# Patient Record
Sex: MALE | Race: WHITE | NOT HISPANIC OR LATINO | Employment: FULL TIME | URBAN - METROPOLITAN AREA
[De-identification: names, ages, dates, MRNs, and addresses within clinical notes are randomized per-mention and may not be internally consistent; named-entity substitution may affect disease eponyms.]

---

## 2025-03-28 ENCOUNTER — HOSPITAL ENCOUNTER (INPATIENT)
Facility: HOSPITAL | Age: 65
LOS: 1 days | Discharge: HOME | End: 2025-03-29
Attending: STUDENT IN AN ORGANIZED HEALTH CARE EDUCATION/TRAINING PROGRAM | Admitting: STUDENT IN AN ORGANIZED HEALTH CARE EDUCATION/TRAINING PROGRAM
Payer: MEDICAID

## 2025-03-28 ENCOUNTER — APPOINTMENT (OUTPATIENT)
Dept: RADIOLOGY | Facility: HOSPITAL | Age: 65
End: 2025-03-28
Payer: MEDICAID

## 2025-03-28 ENCOUNTER — APPOINTMENT (OUTPATIENT)
Dept: CARDIOLOGY | Facility: HOSPITAL | Age: 65
End: 2025-03-28
Payer: MEDICAID

## 2025-03-28 DIAGNOSIS — R41.82 ALTERED MENTAL STATUS, UNSPECIFIED ALTERED MENTAL STATUS TYPE: Primary | ICD-10-CM

## 2025-03-28 DIAGNOSIS — D50.9 IRON DEFICIENCY ANEMIA, UNSPECIFIED IRON DEFICIENCY ANEMIA TYPE: ICD-10-CM

## 2025-03-28 LAB
ALBUMIN SERPL BCP-MCNC: 4.7 G/DL (ref 3.4–5)
ALP SERPL-CCNC: 45 U/L (ref 33–136)
ALT SERPL W P-5'-P-CCNC: 14 U/L (ref 10–52)
ANION GAP SERPL CALC-SCNC: 13 MMOL/L (ref 10–20)
APPEARANCE UR: CLEAR
APTT PPP: 27 SECONDS (ref 26–36)
AST SERPL W P-5'-P-CCNC: 16 U/L (ref 9–39)
BASOPHILS # BLD MANUAL: 0.1 X10*3/UL (ref 0–0.1)
BASOPHILS NFR BLD MANUAL: 2 %
BILIRUB SERPL-MCNC: 0.4 MG/DL (ref 0–1.2)
BILIRUB UR STRIP.AUTO-MCNC: NEGATIVE MG/DL
BUN SERPL-MCNC: 24 MG/DL (ref 6–23)
CALCIUM SERPL-MCNC: 9.5 MG/DL (ref 8.6–10.3)
CARDIAC TROPONIN I PNL SERPL HS: <3 NG/L (ref 0–20)
CHLORIDE SERPL-SCNC: 101 MMOL/L (ref 98–107)
CHOLEST SERPL-MCNC: 159 MG/DL (ref 0–199)
CHOLESTEROL/HDL RATIO: 2.8
CO2 SERPL-SCNC: 24 MMOL/L (ref 21–32)
COLOR UR: COLORLESS
CREAT SERPL-MCNC: 1.15 MG/DL (ref 0.5–1.3)
CRP SERPL-MCNC: 0.26 MG/DL
DACRYOCYTES BLD QL SMEAR: NORMAL
EGFRCR SERPLBLD CKD-EPI 2021: 72 ML/MIN/1.73M*2
EOSINOPHIL # BLD MANUAL: 0.26 X10*3/UL (ref 0–0.7)
EOSINOPHIL NFR BLD MANUAL: 5 %
ERYTHROCYTE [DISTWIDTH] IN BLOOD BY AUTOMATED COUNT: 20.6 % (ref 11.5–14.5)
FLUAV RNA RESP QL NAA+PROBE: NOT DETECTED
FLUBV RNA RESP QL NAA+PROBE: NOT DETECTED
GLUCOSE BLD MANUAL STRIP-MCNC: 95 MG/DL (ref 74–99)
GLUCOSE SERPL-MCNC: 97 MG/DL (ref 74–99)
GLUCOSE UR STRIP.AUTO-MCNC: NORMAL MG/DL
HCT VFR BLD AUTO: 27.2 % (ref 41–52)
HDLC SERPL-MCNC: 55.9 MG/DL
HGB BLD-MCNC: 7.3 G/DL (ref 13.5–17.5)
HYPOCHROMIA BLD QL SMEAR: NORMAL
IMM GRANULOCYTES # BLD AUTO: 0.01 X10*3/UL (ref 0–0.7)
IMM GRANULOCYTES NFR BLD AUTO: 0.2 % (ref 0–0.9)
INR PPP: 1 (ref 0.9–1.1)
KETONES UR STRIP.AUTO-MCNC: NEGATIVE MG/DL
LDLC SERPL CALC-MCNC: 92 MG/DL
LEUKOCYTE ESTERASE UR QL STRIP.AUTO: NEGATIVE
LYMPHOCYTES # BLD MANUAL: 2.08 X10*3/UL (ref 1.2–4.8)
LYMPHOCYTES NFR BLD MANUAL: 40 %
MCH RBC QN AUTO: 16.7 PG (ref 26–34)
MCHC RBC AUTO-ENTMCNC: 26.8 G/DL (ref 32–36)
MCV RBC AUTO: 62 FL (ref 80–100)
MONOCYTES # BLD MANUAL: 0.42 X10*3/UL (ref 0.1–1)
MONOCYTES NFR BLD MANUAL: 8 %
NEUTS SEG # BLD MANUAL: 2.34 X10*3/UL (ref 1.2–7)
NEUTS SEG NFR BLD MANUAL: 45 %
NITRITE UR QL STRIP.AUTO: NEGATIVE
NON HDL CHOLESTEROL: 103 MG/DL (ref 0–149)
NRBC BLD-RTO: 0 /100 WBCS (ref 0–0)
OVALOCYTES BLD QL SMEAR: NORMAL
PH UR STRIP.AUTO: 5 [PH]
PLATELET # BLD AUTO: 297 X10*3/UL (ref 150–450)
POTASSIUM SERPL-SCNC: 4.3 MMOL/L (ref 3.5–5.3)
PROT SERPL-MCNC: 8.1 G/DL (ref 6.4–8.2)
PROT UR STRIP.AUTO-MCNC: NEGATIVE MG/DL
PROTHROMBIN TIME: 10.9 SECONDS (ref 9.8–12.4)
RBC # BLD AUTO: 4.37 X10*6/UL (ref 4.5–5.9)
RBC # UR STRIP.AUTO: NEGATIVE MG/DL
RBC MORPH BLD: NORMAL
SARS-COV-2 RNA RESP QL NAA+PROBE: NOT DETECTED
SCHISTOCYTES BLD QL SMEAR: NORMAL
SODIUM SERPL-SCNC: 134 MMOL/L (ref 136–145)
SP GR UR STRIP.AUTO: 1
STOMATOCYTES BLD QL SMEAR: NORMAL
TOTAL CELLS COUNTED BLD: 100
TRIGL SERPL-MCNC: 55 MG/DL (ref 0–149)
TSH SERPL-ACNC: 1.36 MIU/L (ref 0.44–3.98)
UROBILINOGEN UR STRIP.AUTO-MCNC: NORMAL MG/DL
VLDL: 11 MG/DL (ref 0–40)
WBC # BLD AUTO: 5.2 X10*3/UL (ref 4.4–11.3)

## 2025-03-28 PROCEDURE — 93005 ELECTROCARDIOGRAM TRACING: CPT

## 2025-03-28 PROCEDURE — 70551 MRI BRAIN STEM W/O DYE: CPT | Performed by: STUDENT IN AN ORGANIZED HEALTH CARE EDUCATION/TRAINING PROGRAM

## 2025-03-28 PROCEDURE — 99285 EMERGENCY DEPT VISIT HI MDM: CPT | Mod: 25 | Performed by: STUDENT IN AN ORGANIZED HEALTH CARE EDUCATION/TRAINING PROGRAM

## 2025-03-28 PROCEDURE — 81003 URINALYSIS AUTO W/O SCOPE: CPT

## 2025-03-28 PROCEDURE — 70498 CT ANGIOGRAPHY NECK: CPT

## 2025-03-28 PROCEDURE — G0426 INPT/ED TELECONSULT50: HCPCS | Performed by: STUDENT IN AN ORGANIZED HEALTH CARE EDUCATION/TRAINING PROGRAM

## 2025-03-28 PROCEDURE — 70551 MRI BRAIN STEM W/O DYE: CPT

## 2025-03-28 PROCEDURE — G0378 HOSPITAL OBSERVATION PER HR: HCPCS

## 2025-03-28 PROCEDURE — 80053 COMPREHEN METABOLIC PANEL: CPT

## 2025-03-28 PROCEDURE — 2550000001 HC RX 255 CONTRASTS: Performed by: STUDENT IN AN ORGANIZED HEALTH CARE EDUCATION/TRAINING PROGRAM

## 2025-03-28 PROCEDURE — 84484 ASSAY OF TROPONIN QUANT: CPT

## 2025-03-28 PROCEDURE — 80061 LIPID PANEL: CPT

## 2025-03-28 PROCEDURE — 87636 SARSCOV2 & INF A&B AMP PRB: CPT

## 2025-03-28 PROCEDURE — 86140 C-REACTIVE PROTEIN: CPT

## 2025-03-28 PROCEDURE — 2500000004 HC RX 250 GENERAL PHARMACY W/ HCPCS (ALT 636 FOR OP/ED)

## 2025-03-28 PROCEDURE — 99223 1ST HOSP IP/OBS HIGH 75: CPT

## 2025-03-28 PROCEDURE — 70496 CT ANGIOGRAPHY HEAD: CPT | Performed by: RADIOLOGY

## 2025-03-28 PROCEDURE — 70450 CT HEAD/BRAIN W/O DYE: CPT | Performed by: RADIOLOGY

## 2025-03-28 PROCEDURE — 85610 PROTHROMBIN TIME: CPT

## 2025-03-28 PROCEDURE — 85730 THROMBOPLASTIN TIME PARTIAL: CPT

## 2025-03-28 PROCEDURE — 36415 COLL VENOUS BLD VENIPUNCTURE: CPT

## 2025-03-28 PROCEDURE — 99285 EMERGENCY DEPT VISIT HI MDM: CPT | Performed by: STUDENT IN AN ORGANIZED HEALTH CARE EDUCATION/TRAINING PROGRAM

## 2025-03-28 PROCEDURE — 83036 HEMOGLOBIN GLYCOSYLATED A1C: CPT | Mod: STJLAB

## 2025-03-28 PROCEDURE — 85027 COMPLETE CBC AUTOMATED: CPT

## 2025-03-28 PROCEDURE — 70450 CT HEAD/BRAIN W/O DYE: CPT | Mod: 59

## 2025-03-28 PROCEDURE — 70498 CT ANGIOGRAPHY NECK: CPT | Performed by: RADIOLOGY

## 2025-03-28 PROCEDURE — 84443 ASSAY THYROID STIM HORMONE: CPT

## 2025-03-28 PROCEDURE — 71045 X-RAY EXAM CHEST 1 VIEW: CPT

## 2025-03-28 PROCEDURE — 1200000002 HC GENERAL ROOM WITH TELEMETRY DAILY

## 2025-03-28 PROCEDURE — 85007 BL SMEAR W/DIFF WBC COUNT: CPT

## 2025-03-28 PROCEDURE — 82947 ASSAY GLUCOSE BLOOD QUANT: CPT

## 2025-03-28 PROCEDURE — 2500000001 HC RX 250 WO HCPCS SELF ADMINISTERED DRUGS (ALT 637 FOR MEDICARE OP)

## 2025-03-28 PROCEDURE — 71045 X-RAY EXAM CHEST 1 VIEW: CPT | Mod: FOREIGN READ | Performed by: RADIOLOGY

## 2025-03-28 PROCEDURE — 82607 VITAMIN B-12: CPT | Mod: ELYLAB,STJLAB

## 2025-03-28 RX ORDER — ENOXAPARIN SODIUM 100 MG/ML
40 INJECTION SUBCUTANEOUS EVERY 24 HOURS
Status: DISCONTINUED | OUTPATIENT
Start: 2025-03-28 | End: 2025-03-29 | Stop reason: HOSPADM

## 2025-03-28 RX ORDER — TAMSULOSIN HYDROCHLORIDE 0.4 MG/1
0.4 CAPSULE ORAL DAILY
COMMUNITY

## 2025-03-28 RX ORDER — TAMSULOSIN HYDROCHLORIDE 0.4 MG/1
0.4 CAPSULE ORAL DAILY
Status: DISCONTINUED | OUTPATIENT
Start: 2025-03-29 | End: 2025-03-29 | Stop reason: HOSPADM

## 2025-03-28 RX ORDER — ATORVASTATIN CALCIUM 80 MG/1
80 TABLET, FILM COATED ORAL NIGHTLY
Status: DISCONTINUED | OUTPATIENT
Start: 2025-03-28 | End: 2025-03-29 | Stop reason: HOSPADM

## 2025-03-28 RX ORDER — HYDROXYZINE HYDROCHLORIDE 25 MG/1
25 TABLET, FILM COATED ORAL 3 TIMES DAILY PRN
COMMUNITY

## 2025-03-28 RX ORDER — ASPIRIN 81 MG/1
81 TABLET ORAL DAILY
Status: DISCONTINUED | OUTPATIENT
Start: 2025-03-28 | End: 2025-03-29 | Stop reason: HOSPADM

## 2025-03-28 RX ORDER — CLOPIDOGREL BISULFATE 300 MG/1
300 TABLET, FILM COATED ORAL ONCE
Status: CANCELLED | OUTPATIENT
Start: 2025-03-28

## 2025-03-28 RX ORDER — PANTOPRAZOLE SODIUM 40 MG/1
40 TABLET, DELAYED RELEASE ORAL
Status: DISCONTINUED | OUTPATIENT
Start: 2025-03-29 | End: 2025-03-29 | Stop reason: HOSPADM

## 2025-03-28 RX ORDER — OMEPRAZOLE 40 MG/1
40 CAPSULE, DELAYED RELEASE ORAL DAILY
COMMUNITY

## 2025-03-28 RX ORDER — CLOPIDOGREL BISULFATE 75 MG/1
75 TABLET ORAL DAILY
Status: CANCELLED | OUTPATIENT
Start: 2025-03-29

## 2025-03-28 RX ORDER — HYDROXYZINE HYDROCHLORIDE 25 MG/1
25 TABLET, FILM COATED ORAL 3 TIMES DAILY PRN
Status: DISCONTINUED | OUTPATIENT
Start: 2025-03-28 | End: 2025-03-29 | Stop reason: HOSPADM

## 2025-03-28 RX ADMIN — ASPIRIN 81 MG: 81 TABLET, COATED ORAL at 18:02

## 2025-03-28 RX ADMIN — IOHEXOL 70 ML: 350 INJECTION, SOLUTION INTRAVENOUS at 13:35

## 2025-03-28 RX ADMIN — ATORVASTATIN CALCIUM 80 MG: 80 TABLET, FILM COATED ORAL at 20:45

## 2025-03-28 RX ADMIN — HYDROXYZINE HYDROCHLORIDE 25 MG: 25 TABLET, FILM COATED ORAL at 18:02

## 2025-03-28 RX ADMIN — ENOXAPARIN SODIUM 40 MG: 40 INJECTION SUBCUTANEOUS at 20:45

## 2025-03-28 SDOH — ECONOMIC STABILITY: INCOME INSECURITY: IN THE PAST 12 MONTHS HAS THE ELECTRIC, GAS, OIL, OR WATER COMPANY THREATENED TO SHUT OFF SERVICES IN YOUR HOME?: NO

## 2025-03-28 SDOH — ECONOMIC STABILITY: FOOD INSECURITY: WITHIN THE PAST 12 MONTHS, THE FOOD YOU BOUGHT JUST DIDN'T LAST AND YOU DIDN'T HAVE MONEY TO GET MORE.: NEVER TRUE

## 2025-03-28 SDOH — SOCIAL STABILITY: SOCIAL INSECURITY: HAVE YOU HAD THOUGHTS OF HARMING ANYONE ELSE?: NO

## 2025-03-28 SDOH — SOCIAL STABILITY: SOCIAL INSECURITY: HAVE YOU HAD ANY THOUGHTS OF HARMING ANYONE ELSE?: NO

## 2025-03-28 SDOH — SOCIAL STABILITY: SOCIAL INSECURITY: ARE THERE ANY APPARENT SIGNS OF INJURIES/BEHAVIORS THAT COULD BE RELATED TO ABUSE/NEGLECT?: NO

## 2025-03-28 SDOH — SOCIAL STABILITY: SOCIAL INSECURITY: WITHIN THE LAST YEAR, HAVE YOU BEEN AFRAID OF YOUR PARTNER OR EX-PARTNER?: NO

## 2025-03-28 SDOH — ECONOMIC STABILITY: FOOD INSECURITY: WITHIN THE PAST 12 MONTHS, YOU WORRIED THAT YOUR FOOD WOULD RUN OUT BEFORE YOU GOT THE MONEY TO BUY MORE.: NEVER TRUE

## 2025-03-28 SDOH — SOCIAL STABILITY: SOCIAL INSECURITY: ARE YOU OR HAVE YOU BEEN THREATENED OR ABUSED PHYSICALLY, EMOTIONALLY, OR SEXUALLY BY ANYONE?: NO

## 2025-03-28 SDOH — SOCIAL STABILITY: SOCIAL INSECURITY: DO YOU FEEL ANYONE HAS EXPLOITED OR TAKEN ADVANTAGE OF YOU FINANCIALLY OR OF YOUR PERSONAL PROPERTY?: NO

## 2025-03-28 SDOH — SOCIAL STABILITY: SOCIAL INSECURITY: WITHIN THE LAST YEAR, HAVE YOU BEEN HUMILIATED OR EMOTIONALLY ABUSED IN OTHER WAYS BY YOUR PARTNER OR EX-PARTNER?: NO

## 2025-03-28 SDOH — SOCIAL STABILITY: SOCIAL INSECURITY
WITHIN THE LAST YEAR, HAVE YOU BEEN KICKED, HIT, SLAPPED, OR OTHERWISE PHYSICALLY HURT BY YOUR PARTNER OR EX-PARTNER?: NO

## 2025-03-28 SDOH — SOCIAL STABILITY: SOCIAL INSECURITY: DO YOU FEEL UNSAFE GOING BACK TO THE PLACE WHERE YOU ARE LIVING?: NO

## 2025-03-28 SDOH — SOCIAL STABILITY: SOCIAL INSECURITY: DOES ANYONE TRY TO KEEP YOU FROM HAVING/CONTACTING OTHER FRIENDS OR DOING THINGS OUTSIDE YOUR HOME?: NO

## 2025-03-28 SDOH — SOCIAL STABILITY: SOCIAL INSECURITY: ABUSE: ADULT

## 2025-03-28 SDOH — SOCIAL STABILITY: SOCIAL INSECURITY
WITHIN THE LAST YEAR, HAVE YOU BEEN RAPED OR FORCED TO HAVE ANY KIND OF SEXUAL ACTIVITY BY YOUR PARTNER OR EX-PARTNER?: NO

## 2025-03-28 SDOH — SOCIAL STABILITY: SOCIAL INSECURITY: HAS ANYONE EVER THREATENED TO HURT YOUR FAMILY OR YOUR PETS?: NO

## 2025-03-28 SDOH — SOCIAL STABILITY: SOCIAL INSECURITY: WERE YOU ABLE TO COMPLETE ALL THE BEHAVIORAL HEALTH SCREENINGS?: YES

## 2025-03-28 ASSESSMENT — COGNITIVE AND FUNCTIONAL STATUS - GENERAL
DAILY ACTIVITIY SCORE: 24
DAILY ACTIVITIY SCORE: 24
PATIENT BASELINE BEDBOUND: NO
MOBILITY SCORE: 24
MOBILITY SCORE: 24

## 2025-03-28 ASSESSMENT — LIFESTYLE VARIABLES
HAVE YOU EVER FELT YOU SHOULD CUT DOWN ON YOUR DRINKING: NO
AUDIT-C TOTAL SCORE: 0
AUDIT-C TOTAL SCORE: 0
SKIP TO QUESTIONS 9-10: 1
HOW MANY STANDARD DRINKS CONTAINING ALCOHOL DO YOU HAVE ON A TYPICAL DAY: PATIENT DOES NOT DRINK
TOTAL SCORE: 0
EVER HAD A DRINK FIRST THING IN THE MORNING TO STEADY YOUR NERVES TO GET RID OF A HANGOVER: NO
HOW OFTEN DO YOU HAVE 6 OR MORE DRINKS ON ONE OCCASION: NEVER
HOW OFTEN DO YOU HAVE A DRINK CONTAINING ALCOHOL: NEVER
HAVE PEOPLE ANNOYED YOU BY CRITICIZING YOUR DRINKING: NO
EVER FELT BAD OR GUILTY ABOUT YOUR DRINKING: NO

## 2025-03-28 ASSESSMENT — COLUMBIA-SUICIDE SEVERITY RATING SCALE - C-SSRS
2. HAVE YOU ACTUALLY HAD ANY THOUGHTS OF KILLING YOURSELF?: NO
1. IN THE PAST MONTH, HAVE YOU WISHED YOU WERE DEAD OR WISHED YOU COULD GO TO SLEEP AND NOT WAKE UP?: NO
6. HAVE YOU EVER DONE ANYTHING, STARTED TO DO ANYTHING, OR PREPARED TO DO ANYTHING TO END YOUR LIFE?: NO

## 2025-03-28 ASSESSMENT — ACTIVITIES OF DAILY LIVING (ADL)
HEARING - RIGHT EAR: FUNCTIONAL
FEEDING YOURSELF: INDEPENDENT
LACK_OF_TRANSPORTATION: NO
JUDGMENT_ADEQUATE_SAFELY_COMPLETE_DAILY_ACTIVITIES: YES
LACK_OF_TRANSPORTATION: NO
DRESSING YOURSELF: INDEPENDENT
PATIENT'S MEMORY ADEQUATE TO SAFELY COMPLETE DAILY ACTIVITIES?: YES
WALKS IN HOME: INDEPENDENT
ADEQUATE_TO_COMPLETE_ADL: YES
BATHING: INDEPENDENT
GROOMING: INDEPENDENT
TOILETING: INDEPENDENT
HEARING - LEFT EAR: FUNCTIONAL

## 2025-03-28 ASSESSMENT — PAIN DESCRIPTION - PAIN TYPE: TYPE: ACUTE PAIN

## 2025-03-28 ASSESSMENT — PAIN SCALES - GENERAL
PAINLEVEL_OUTOF10: 6
PAINLEVEL_OUTOF10: 0 - NO PAIN
PAINLEVEL_OUTOF10: 0 - NO PAIN

## 2025-03-28 ASSESSMENT — PATIENT HEALTH QUESTIONNAIRE - PHQ9
1. LITTLE INTEREST OR PLEASURE IN DOING THINGS: NOT AT ALL
2. FEELING DOWN, DEPRESSED OR HOPELESS: NOT AT ALL
SUM OF ALL RESPONSES TO PHQ9 QUESTIONS 1 & 2: 0

## 2025-03-28 ASSESSMENT — PAIN - FUNCTIONAL ASSESSMENT
PAIN_FUNCTIONAL_ASSESSMENT: 0-10

## 2025-03-28 ASSESSMENT — PAIN DESCRIPTION - LOCATION: LOCATION: ABDOMEN

## 2025-03-28 NOTE — ED PROVIDER NOTES
EMERGENCY DEPARTMENT ENCOUNTER      Pt Name: Donald Chowdary  MRN: [id number]  Birthdate 1960  Date of evaluation: 3/28/2025  Provider: South Rosenberg MD    CHIEF COMPLAINT       Chief Complaint   Patient presents with    Dizziness    Flu Symptoms    Headache     Started 4 days ago, endorse n/v weakness and sob      HISTORY OF PRESENT ILLNESS    HPI  64-year-old male presents emergency department chief complaint of altered mental status dizziness and flulike symptoms.  Patient claims that approximately 2 days ago he developed weakness and a headache he claims that he had a such severe headache he cannot report to work.  He claims that he rested at which point it went away today he developed difficulty walking dizziness and lightheadedness at approximately 10:30 in the afternoon.  He reported to work and into the person that he works for claims that he was acting very differently having shuffled gait and difficulty with speech.  Nursing Notes were reviewed.    PAST MEDICAL HISTORY     Past Medical History:   Diagnosis Date    Personal history of other diseases of the circulatory system 02/19/2021    History of peripheral vascular disease    Personal history of other venous thrombosis and embolism 02/19/2021    History of deep venous thrombosis    Unspecified injury of left eye and orbit, initial encounter 02/19/2021    Traumatic blindness of left eye         SURGICAL HISTORY       Past Surgical History:   Procedure Laterality Date    OTHER SURGICAL HISTORY  02/19/2021    Endovenous laser ablation         CURRENT MEDICATIONS       Previous Medications    No medications on file       ALLERGIES     Patient has no known allergies.    FAMILY HISTORY     No family history on file.       SOCIAL HISTORY       Social History     Socioeconomic History    Marital status:      Social Drivers of Health     Financial Resource Strain: Low Risk  (3/5/2025)    Received from [facility]    Overall Financial Resource  Strain (CARDIA)     Difficulty of Paying Living Expenses: Not hard at all   Food Insecurity: No Food Insecurity (3/5/2025)    Received from [facility]    Hunger Vital Sign     Worried About Running Out of Food in the Last Year: Never true     Ran Out of Food in the Last Year: Never true   Transportation Needs: No Transportation Needs (3/5/2025)    Received from [facility]    PRAPARE - Transportation     Lack of Transportation (Medical): No     Lack of Transportation (Non-Medical): No   Physical Activity: Insufficiently Active (3/5/2025)    Received from [facility]    Exercise Vital Sign     Days of Exercise per Week: 1 day     Minutes of Exercise per Session: 10 min   Stress: No Stress Concern Present (3/5/2025)    Received from [facility]    [employer] [place] [employer] - Occupational Stress Questionnaire     Feeling of Stress : Not at all   Social Connections: Moderately Isolated (3/5/2025)    Received from [facility]    Social Connection and Isolation Panel [NHANES]     Frequency of Communication with Friends and Family: More than three times a week     Frequency of Social Gatherings with Friends and Family: Twice a week     Attends Faith Services: More than 4 times per year     Active Member of Clubs or Organizations: No     Attends Club or Organization Meetings: Never     Marital Status:    Housing Stability: Unknown (3/5/2025)    Received from [facility]    Housing Stability Vital Sign     Unable to Pay for Housing in the Last Year: No       SCREENINGS                        PHYSICAL EXAM    (up to 7 for level 4, 8 or more for level 5)     ED Triage Vitals [03/28/25 1313]   Temperature Heart Rate Respirations BP   36.9 °C (98.4 °F) 76 18 114/73      Pulse Ox Temp Source Heart Rate Source Patient Position   98 % Temporal Monitor Sitting      BP Location FiO2 (%)     Right arm --       Physical Exam  HENT:      Head: Normocephalic and atraumatic.       Mouth/Throat:      Mouth: Mucous membranes are moist.      Pharynx: Oropharynx is clear.   Eyes:      Extraocular Movements: Extraocular movements intact.      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  The patient was endorsing blurry vision in his natural eye.   Cardiovascular:      Rate and Rhythm: Normal rate and regular rhythm.      Pulses: Normal pulses.      Heart sounds: Normal heart sounds.   Pulmonary:      Effort: Pulmonary effort is normal.   Abdominal:      General: Abdomen is flat.   Musculoskeletal:         General: Normal range of motion.   Skin:     General: Skin is warm.      Capillary Refill: Capillary refill takes less than 2 seconds.   Neurological:      Mental Status: He is alert and oriented to person, place, and time.      GCS: GCS eye subscore is 4. GCS verbal subscore is 5. GCS motor subscore is 6.      Sensory: Sensation is intact.      Motor: Weakness present.      Coordination: Heel to Shin Test normal.      Comments: Patient did have difficulty finger-nose on the right-hand side this could be due to the decreased Perception due to not having a left eye.          DIAGNOSTIC RESULTS     LABS:  Labs Reviewed   CBC WITH AUTO DIFFERENTIAL - Abnormal       Result Value    WBC 5.2      nRBC 0.0      RBC 4.37 (*)     Hemoglobin 7.3 (*)     Hematocrit 27.2 (*)     MCV 62 (*)     MCH 16.7 (*)     MCHC 26.8 (*)     RDW 20.6 (*)     Platelets 297      Immature Granulocytes %, Automated 0.2      Immature Granulocytes Absolute, Automated 0.01     COMPREHENSIVE METABOLIC PANEL - Abnormal    Glucose 97      Sodium 134 (*)     Potassium 4.3      Chloride 101      Bicarbonate 24      Anion Gap 13      Urea Nitrogen 24 (*)     Creatinine 1.15      eGFR 72      Calcium 9.5      Albumin 4.7      Alkaline Phosphatase 45      Total Protein 8.1      AST 16      Bilirubin, Total 0.4      ALT 14     TROPONIN I, HIGH SENSITIVITY - Normal     Troponin I, High Sensitivity <3      Narrative:     Less than 99th percentile of normal range cutoff-  Female and children under 18 years old <14 ng/L; Male <21 ng/L: Negative  Repeat testing should be performed if clinically indicated.     Female and children under 18 years old 14-50 ng/L; Male 21-50 ng/L:  Consistent with possible cardiac damage and possible increased clinical   risk. Serial measurements may help to assess extent of myocardial damage.     >50 ng/L: Consistent with cardiac damage, increased clinical risk and  myocardial infarction. Serial measurements may help assess extent of   myocardial damage.      NOTE: Children less than 1 year old may have higher baseline troponin   levels and results should be interpreted in conjunction with the overall   clinical context.     NOTE: Troponin I testing is performed using a different   testing methodology at [facility] than at other   [facility]. Direct result comparisons should only   be made within the same method.   PROTIME-INR - Normal    Protime 10.9      INR 1.0     APTT - Normal    aPTT 27      Narrative:     The APTT is no longer used for monitoring Unfractionated Heparin Therapy. For monitoring Heparin Therapy, use the Heparin Assay.   TSH WITH REFLEX TO FREE T4 IF ABNORMAL - Normal    Thyroid Stimulating Hormone 1.36      Narrative:     TSH testing is performed using different testing methodology at [facility] than at other [facility]. Direct result comparisons should only be made within the same method.     POCT GLUCOSE - Normal    POCT Glucose 95     INFLUENZA A AND B PCR   SARS-COV-2 PCR   URINALYSIS WITH REFLEX CULTURE AND MICROSCOPIC    Narrative:     The following orders were created for panel order Urinalysis with Reflex Culture and Microscopic.  Procedure                               Abnormality         Status                     ---------                               -----------         ------                      Urinalysis with Reflex C...[[id number]]                                                 Extra Urine Gray Tube[[id number]]                                                         Please view results for these tests on the individual orders.   URINALYSIS WITH REFLEX CULTURE AND MICROSCOPIC   EXTRA URINE GRAY TUBE   POCT GLUCOSE METER   MANUAL DIFFERENTIAL    Neutrophils %, Manual 45.0      Lymphocytes %, Manual 40.0      Monocytes %, Manual 8.0      Eosinophils %, Manual 5.0      Basophils %, Manual 2.0      Seg Neutrophils Absolute, Manual 2.34      Lymphocytes Absolute, Manual 2.08      Monocytes Absolute, Manual 0.42      Eosinophils Absolute, Manual 0.26      Basophils Absolute, Manual 0.10      Total Cells Counted 100      RBC Morphology See Below      Hypochromia Mild      RBC Fragments Few      Ovalocytes Few      Teardrop Cells Few      Stomatocytes Few         All other labs were within normal range or not returned as of this dictation.    Imaging  CT brain attack angio head and neck W and WO IV contrast   Final Result   1. No intracranial major vascular occlusion.   2. No flow-limiting stenosis or significant plaque irregularity in   the neck.        MACRO:   None        Signed by: Evgeny Urban 3/28/2025 2:17 PM   Dictation workstation:   [id number]      CT brain attack head wo IV contrast   Final Result   No acute intracranial pathology identified.             MACRO:   Kyree Lopez discussed the significance and urgency of this critical   finding by telephone with  HAMLET RUIZ on 3/28/2025 at 1:57 pm.   (**-RCF-**) Findings:  See findings.             Signed by: Kyree Lopez 3/28/2025 1:57 PM   Dictation workstation:   [id number]      XR chest 1 view    (Results Pending)        Procedures  Procedures     EMERGENCY DEPARTMENT COURSE/MDM:   Medical Decision Making  64-year-old male presents emergency department chief complaint of dizziness.  Medical management treatment emergency department  consist of brain attack protocol.  CT of the head is unremarkable CTA was also performed showed no acute concerning findings.  Labs are concerning for hemoglobin of 7.3.  However the patient is grossly chronically anemic.  Does have a past medical history of thalassemia.  Patient's troponins are negative. CT brain attack angio and CT head without contrast show-  IMPRESSION:  1. No intracranial major vascular occlusion.  2. No flow-limiting stenosis or significant plaque irregularity in  the neck.  Conversation was had with the patient regarding possible drug use he denies it.  Patient be admitted to the hospital service for continued observation and management.  Neurology did not have any appropriate recommendations.  Was static vitals in the patient were normal.  The patient is admitted to the hospital service.        ED Course as of 03/28/25 1631   Fri Mar 28, 2025   1414 HEMOGLOBIN(!): 7.3  [facility] blood work CBC  4/11/2024 hemoglobin 7.7  3/29/2024 hemoglobin 7.7  4/18/2023 hemoglobin 11 [FN]      ED Course User Index  [FN] Екатерина Brooks MD         Diagnoses as of 03/28/25 1631   Altered mental status, unspecified altered mental status type        Patient and or family in agreement and understanding of treatment plan.  All questions answered.      I reviewed the case with the attending ED physician. The attending ED physician agrees with the plan. Patient and/or patient´s representative was counseled regarding labs, imaging, likely diagnosis, and plan. All questions were answered.    ED Medications administered this visit:    Medications   iohexol (OMNIPaque) 350 mg iodine/mL solution 70 mL (70 mL intravenous Given 3/28/25 1335)       New Prescriptions from this visit:    New Prescriptions    No medications on file       Follow-up:  No follow-up provider specified.      Final Impression: No diagnosis found.      (Please note that portions of this note were completed with a voice recognition program.   Efforts were made to edit the dictations but occasionally words are mis-transcribed.)     South Rosenberg MD  Resident  03/28/25 0930

## 2025-03-28 NOTE — CONSULTS
"Inpatient consult to Neuro TeleStroke  Consult performed by: Marco Benton MD  Consult ordered by: Екатерина Brooks MD        Virtual Visit start time: 142 pm    History Of Present Illness:  Historian: Patient and ED Provider   Donald Chowdary is a 64 y.o. male presenting with 2 days of headache, disorientation, sleepiness, lightheadedness everytime he gets up, unsteady walking. He slept for most of the past couple days, felt rested and went to work today but symptoms came back at work. Does not have headache today but s feeling lightheaded everytime he gets up from sitting position as if he is going to collapse. Also feels confused. Denied any preceding infections, fever. No neck pain  Last known well: 2 days ago  Had stroke symptoms resolved at time of presentation: No   Current antiplatelet/anticoagulant use:  none    Prior Functional Status (Modified Lott Scale):  0 The patient has no residual symptoms.    Stroke Risk Factors:  none    Last Recorded Vitals:  Blood pressure 114/73, pulse 76, temperature 36.9 °C (98.4 °F), temperature source Temporal, resp. rate 18, height 1.803 m (5' 11\"), weight 90.1 kg (198 lb 10.2 oz), SpO2 98%.    NIHSS   1A. Level of Consciousness: 0  1B. Ask Month and Age: 0  1C. Blink Eyes & Squeeze Hands: 0  2. Best Gaze: 0  3. Visual: 0  4. Facial Palsy: 0  5A. Motor - Left Arm: 0  5B. Motor - Right Arm: 0  6A. Motor - Left Le  6B. Motor - Right Le  7. Limb Ataxia: 0  8. Sensory Loss: 0  9. Best Language: 0  10. Dysarthria: 0  11. Extinction and Inattention: 0  NIH Stroke Scale: 0     Able to walk with out assistance, normal gait.   Normal speech, language, intact comprehension to 3 step commands    Relevant Results:  LABS:  No results found for: \"GLUCOSE\", \"INR\"   Results for orders placed or performed during the hospital encounter of 25 (from the past 24 hours)   POCT GLUCOSE   Result Value Ref Range    POCT Glucose 95 74 - 99 mg/dL        CT Head Imaging:  [facility] imaging " personally reviewed, showed no acute ischemic / hemorrhagic changes     CTA Head and Neck Imaging:  CTA head and neck imaging personally reviewed, no large vessel occlusion or severe stenosis seen        Assessment:  Stroke not suspected, alternative etiology likely   Neurologic exam with no deficits concerning for acute ischemic stroke. Tiredness, headaches, positional lightheadedness, r/o infection/ metabolic encephalopathy.     IV Thrombolysis IV Thrombolysis Checklist        IV Thrombolysis Given: No; Thrombolysis contraindication reason: Working diagnosis is NOT a suspected ischemic stroke, Neurologic signs have spontaneously resolved , and Time from Last Known Well (or stroke onset) is >4.5 hours           Patient is a candidate for thrombectomy:  yes/no: No; contraindication reason: No evidence of proximal occlusion    Additional Recommendations:  Infectious, metabolic work up  Orthostatic vitals      Disposition:  Patient will remain at referring facility for further evaluation and management.    Virtual or Telephone Consent    An interactive audio and video telecommunication system which permits real time communications between the patient (at the originating site) and provider (at the distant site) was utilized to provide this telehealth service.   Verbal consent was requested and obtained from Donald Chowdary on this date, 03/28/25 for a telehealth visit.      Telestroke is covered in shift work. If there are further Neurological questions or concerns please contact your regional neurologist on call during daytime hours or contact the transfer center with an ADT20 order.    Marco Benton MD

## 2025-03-28 NOTE — DISCHARGE INSTRUCTIONS
You were admitted to the hospital with an transient episode of altered mental status, likely secondary to increased stress, anemia, and possible obstructive sleep apnea.     Please call and follow up with your primary care provider Dr. Hurt and schedule a follow up appointment in 2-3 days. Please discuss with your PCP regarding a sleep study evaluation.   Please establish care with hematologist (Dr. Ackerman) for your iron deficiency anemia and history of thalassemia. You've been provided with a referral to her office.       Please take all of your medications as directed.     New medications:    Ferrous sulfate 325mg tablet every other day      If you have any concerning symptoms, or worsening symptoms please call or return, such as chest pain, shortness of breath, new onset confusion, loss of sensation, or fever >103F.     Thank you for allowing us to participate in your health care.     -[facility] Inpatient Medicine Teaching Service

## 2025-03-28 NOTE — H&P
Internal Medicine    Admission H&P      Patient Donald Chowdary PCP Sendy Hurt MD    MRN [id number]  Admission Date 3/28/2025      Chief Complaint/Reason for Admission:  Donald is a 64 y.o. male who presented today with AMS    Subjective    Subjective   History of Presenting Illness  Mr. Donald Chowdary is a 64 y.o. 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 Patient seen and examined seen with his sons at bedside in the emergency department and is a good historian. Per patient and sons, he was noted by his client whom he is a  for to have trouble turning on the stove from 12: 30/1 PM. She was worried that patient was having a stroke so she drove him to the ED for evaluation. Patient is able to recall being driven to the hospital but cannot remember what happened at his clients house. He reports he's been having a headache associated with nausea and vomiting for the past two days.  Headache is better with being in a dark room and is exacerbated by wearing his prosthetic eye.  He has also been having dizziness with standing.  He had an episode of hematemesis 2 days ago when he noticed dark red blood in his vomitus.  In addition, he has been having dark diarrhea for the past few weeks although he has been on a beets diet.  He also reports some gum bleeding with toothbrushing.  He denies any recent NSAID use.  He has had his prosthetic left eye since 6 and half years old after a traumatic incident.  Patient is currently feeling well.  He denies any chest pain, shortness of breath at rest, palpitations, abdominal pain, nausea, dysuria, or diarrhea.    ED course:  VS: /73  Pulse 76  Temp 369 °C  SpO2 98% on RA  RR 18  Labs: CBC notable for hemoglobin of 7.3 (Hgb in 2024 was 7.7), CMP remarkable for hyponatremia 134,  normal TSH and glucose, negative flu and COVID,  EKG: NSR with HR of 73 bpm, prolonged QTc of 513  Imaging: CXR shows mild central pulmonary venous congestion. Mild right basilar linear airspace disease suggestive for atelectasis. Moderate to large residual hiatal hernia.  CT head without contrast negative for any acute intracranial abnormalities  CTA head and neck showed no intracranial major vascular occlusion. No flow-limiting stenosis or significant plaque irregularity in the neck.  Intervention: Patient was evaluated by telestroke neurologist who had low suspicion for stroke, negative orthostats    Patient was admitted to [facility] with telemetry under teaching for further management of TIA.    Past Medical History  Active Ambulatory Problems     Diagnosis Date Noted    No Active Ambulatory Problems     Resolved Ambulatory Problems     Diagnosis Date Noted    No Resolved Ambulatory Problems     Past Medical History:   Diagnosis Date    Personal history of other diseases of the circulatory system 02/19/2021    Personal history of other venous thrombosis and embolism 02/19/2021    Unspecified injury of left eye and orbit, initial encounter 02/19/2021       Past Surgical History   Past Surgical History:   Procedure Laterality Date    OTHER SURGICAL HISTORY  02/19/2021    Endovenous laser ablation       Social History  Never a smoker. No alcohol or recreational drug use.    Home Medication:  Prior to Admission medications    Medication Sig Start Date End Date Taking? Authorizing Provider   hydrOXYzine HCL (Atarax) 25 mg tablet Take 1 tablet (25 mg) by mouth 3 times a day as needed for anxiety.  Patient taking differently: Take 1 tablet (25 mg) by mouth 3 times a day.   Yes Historical Provider, MD   omeprazole (PriLOSEC) 40 mg DR capsule Take 1 capsule (40 mg) by mouth once daily. Do not crush or chew.   Yes Historical Provider, MD   tamsulosin (Flomax) 0.4 mg 24 hr capsule Take 1 capsule (0.4 mg) by mouth once daily.    "Yes Historical Provider, MD        Family History  Family history of thalassemia minor.    Allergies:  No Known Allergies     Review of System:  12-system ROS negative except as documented in HPI    Objective    Objective   Vital Signs:  Visit Vitals  /67 Comment: standing   Pulse 84   Temp 36.9 °C (98.4 °F) (Temporal)   Resp 18   Ht 1.803 m (5' 11\")   Wt 90.1 kg (198 lb 10.2 oz)   SpO2 100%   BMI 27.70 kg/m²   BSA 2.12 m²        Physical Examination:  General: A&Ox4, alert, coopertive, not in acute distress, resting comfortably in bed upon examination   HEENT: Normocephalic, atraumatic, EOMI, moist mucous membranes  Neck: Neck supple, trachea midline, no evidence of trauma  Cardiovascular: RRR, S1 & S2 appreciated, no murmurs, rubs, or gallops appreciated, distal pulses 2+ bilaterally (radial and dorsalis pedis)  Respiratory: CTAB, no respiratory distress, no wheezing, rales or rhonchi, on RA  Abdomen: Soft, nondistended, nontender to palpation, +bowel sounds, no guarding rigidity or rebound tenderness  MSK: No joint swelling, normal movements of all extremities.   Neuro: No focal deficits, normal motor function, normal sensation, follows all commands. No asterixis noted, NIH of 0  Skin: Warm and dry, no lesions, contusions, or erythema.  Psychiatric: Judgment intact.  Appropriate mood, affect and behavior.    Laboratory Data:  Results for orders placed or performed during the hospital encounter of 03/28/25 (from the past 24 hours)   POCT GLUCOSE   Result Value Ref Range    POCT Glucose 95 74 - 99 mg/dL   CBC and Auto Differential   Result Value Ref Range    WBC 5.2 4.4 - 11.3 x10*3/uL    nRBC 0.0 0.0 - 0.0 /100 WBCs    RBC 4.37 (L) 4.50 - 5.90 x10*6/uL    Hemoglobin 7.3 (L) 13.5 - 17.5 g/dL    Hematocrit 27.2 (L) 41.0 - 52.0 %    MCV 62 (L) 80 - 100 fL    MCH 16.7 (L) 26.0 - 34.0 pg    MCHC 26.8 (L) 32.0 - 36.0 g/dL    RDW 20.6 (H) 11.5 - 14.5 %    Platelets 297 150 - 450 x10*3/uL    Immature Granulocytes %, " Automated 0.2 0.0 - 0.9 %    Immature Granulocytes Absolute, Automated 0.01 0.00 - 0.70 x10*3/uL   Comprehensive metabolic panel   Result Value Ref Range    Glucose 97 74 - 99 mg/dL    Sodium 134 (L) 136 - 145 mmol/L    Potassium 4.3 3.5 - 5.3 mmol/L    Chloride 101 98 - 107 mmol/L    Bicarbonate 24 21 - 32 mmol/L    Anion Gap 13 10 - 20 mmol/L    Urea Nitrogen 24 (H) 6 - 23 mg/dL    Creatinine 1.15 0.50 - 1.30 mg/dL    eGFR 72 >60 mL/min/1.73m*2    Calcium 9.5 8.6 - 10.3 mg/dL    Albumin 4.7 3.4 - 5.0 g/dL    Alkaline Phosphatase 45 33 - 136 U/L    Total Protein 8.1 6.4 - 8.2 g/dL    AST 16 9 - 39 U/L    Bilirubin, Total 0.4 0.0 - 1.2 mg/dL    ALT 14 10 - 52 U/L   Troponin I, High Sensitivity   Result Value Ref Range    Troponin I, High Sensitivity <3 0 - 20 ng/L   Protime-INR   Result Value Ref Range    Protime 10.9 9.8 - 12.4 seconds    INR 1.0 0.9 - 1.1   APTT   Result Value Ref Range    aPTT 27 26 - 36 seconds   TSH with reflex to Free T4 if abnormal   Result Value Ref Range    Thyroid Stimulating Hormone 1.36 0.44 - 3.98 mIU/L   Manual Differential   Result Value Ref Range    Neutrophils %, Manual 45.0 40.0 - 80.0 %    Lymphocytes %, Manual 40.0 13.0 - 44.0 %    Monocytes %, Manual 8.0 2.0 - 10.0 %    Eosinophils %, Manual 5.0 0.0 - 6.0 %    Basophils %, Manual 2.0 0.0 - 2.0 %    Seg Neutrophils Absolute, Manual 2.34 1.20 - 7.00 x10*3/uL    Lymphocytes Absolute, Manual 2.08 1.20 - 4.80 x10*3/uL    Monocytes Absolute, Manual 0.42 0.10 - 1.00 x10*3/uL    Eosinophils Absolute, Manual 0.26 0.00 - 0.70 x10*3/uL    Basophils Absolute, Manual 0.10 0.00 - 0.10 x10*3/uL    Total Cells Counted 100     RBC Morphology See Below     Hypochromia Mild     RBC Fragments Few     Ovalocytes Few     Teardrop Cells Few     Stomatocytes Few    Influenza A, and B PCR   Result Value Ref Range    Flu A Result Not Detected Not Detected    Flu B Result Not Detected Not Detected   Sars-CoV-2 PCR   Result Value Ref Range    Coronavirus  2019, PCR Not Detected Not Detected   Urinalysis with Reflex Culture and Microscopic   Result Value Ref Range    Color, Urine Colorless (N) Light-Yellow, Yellow, Dark-Yellow    Appearance, Urine Clear Clear    Specific Gravity, Urine 1.003 (N) 1.005 - 1.035    pH, Urine 5.0 5.0, 5.5, 6.0, 6.5, 7.0, 7.5, 8.0    Protein, Urine NEGATIVE NEGATIVE, 10 (TRACE), 20 (TRACE) mg/dL    Glucose, Urine Normal Normal mg/dL    Blood, Urine NEGATIVE NEGATIVE mg/dL    Ketones, Urine NEGATIVE NEGATIVE mg/dL    Bilirubin, Urine NEGATIVE NEGATIVE mg/dL    Urobilinogen, Urine Normal Normal mg/dL    Nitrite, Urine NEGATIVE NEGATIVE    Leukocyte Esterase, Urine NEGATIVE NEGATIVE       Imaging:  XR chest 1 view    Result Date: 3/28/2025  STUDY: Chest Radiograph;  03/28/2025 at 2:40 PM INDICATION: Dizziness. COMPARISON: CTA chest 12/28/20. ACCESSION NUMBER(S): [id number] ORDERING CLINICIAN: HAMLET RUIZ TECHNIQUE:  Frontal chest was obtained at 1440 hours. FINDINGS: CARDIOMEDIASTINAL SILHOUETTE: Cardiomediastinal silhouette is normal in size.  There is mild prominence of the central pulmonary vasculature.  Increased focal radiolucency overlying the left central and inferior mediastinum likely reflects a large hiatal hernia. LUNGS: Shallow inspiration with mild bilateral basilar atelectasis.  ABDOMEN: No remarkable upper abdominal findings.  BONES: No acute osseous changes.    Suggestion of mild central pulmonary venous congestion.  Mild right basilar linear airspace disease suggestive for atelectasis.  Moderate to large residual hiatal hernia. Signed by Tay David MD    CT brain attack angio head and neck W and WO IV contrast    Result Date: 3/28/2025  Interpreted By:  Evgeny Urban, STUDY: CT BRAIN ATTACK ANGIO HEAD AND NECK W AND WO IV CONTRAST;  3/28/2025 1:42 pm   INDICATION: Signs/Symptoms:Blurry vision and remaining eye right side.     COMPARISON: CT head today.   ACCESSION NUMBER(S): [id number]   ORDERING CLINICIAN:  HAMLET RUIZ   TECHNIQUE: 70 ML of Omnipaque 350 was administered intravenously and axial images of the head and neck were acquired.  Coronal, sagittal, and 3-D reconstructions were provided for review.   FINDINGS:   CTA COW: No major vascular occlusion.    No aneurysms.  No vascular malformations. Patent dural venous sinuses and intracranial deep venous structures.   CTA CAROTID: No aortic aneurysm or dissection. No significant stenoses at the origins of the great vessels from the aortic arch. No significant stenoses of the brachycephalic artery or bilateral subclavian arteries.   No significant stenoses bilateral carotid arterial systems. No significant stenoses bilateral vertebral arterial systems.   NONVASCULAR NECK: No soft tissue mass. No adenopathy. Salivary glands are unremarkable. Thyroid gland unremarkable. Bones intact.   The esophagus is somewhat distended with a fluid gas level therein. This was also seen on the chest CT from 2020. Correlate clinically for dysmotility.       1. No intracranial major vascular occlusion. 2. No flow-limiting stenosis or significant plaque irregularity in the neck.   MACRO: None   Signed by: Evgeny Urban 3/28/2025 2:17 PM Dictation workstation:   [id number]    CT brain attack head wo IV contrast    Result Date: 3/28/2025  Interpreted By:  Kyree Lopez, STUDY: CT BRAIN ATTACK HEAD WO IV CONTRAST  3/28/2025 1:32 pm   INDICATION: Signs/Symptoms:Stroke Evaluation   COMPARISON: None.   ACCESSION NUMBER(S): [id number]   ORDERING CLINICIAN: HAMLET RUIZ   TECHNIQUE: Contiguous axial CT images of the brain were obtained without IV contrast.   FINDINGS: There is streak artifact from a left ocular prosthesis which could obscure findings. The ventricles, cisterns and sulci are prominent, consistent with  mild diffuse volume loss.   Gray-white differentiation is preserved. No acute intracranial hemorrhage or mass effect. No midline shift. Patent basal cisterns. No extraaxial  fluid collections.   The calvaria is intact. The visualized paranasal sinuses and mastoid air cells are clear.       No acute intracranial pathology identified.     MACRO: Kyree Lopez discussed the significance and urgency of this critical finding by telephone with  HAMLET RUIZ on 3/28/2025 at 1:57 pm. (**-RCF-**) Findings:  See findings.     Signed by: Kyree Lopez 3/28/2025 1:57 PM Dictation workstation:   [id number]      Medications:  Scheduled medications    Continuous medications    PRN medications    Assessment    Assessment & Plan   Mr. Donald Chowdary is a 64 y.o. male with PMH significant for YAMILE not on iron supplements, thalassemia minor, symptomatic paraesoph hernia, GERD, lumbar stenosis, hx of DVT and PVD, traumatic blindness with L prosthetic eye who presented to [facility] ED on 3/28/25 with AMS.  Vitally stable on presentation.  CT head and CTA head and neck were negative for any acute intracranial abnormalities.  Patient was admitted to [facility] with telemetry for further stroke workup.    Assessment & Plan  Altered mental status, unspecified altered mental status type    #Transient AMS, c/f for TIA  - CT head showed no acute intracranial pathology.  - CTA head/neck showed showed no intracranial major vascular occlusion. No flow-limiting stenosis or significant plaque irregularity in the neck.  - Lipid panel and TSH wnl   Plan:  - Started on ASA 81 mg, atorvastatin 80  - MRI brain wo contrast ordered  - Hemoglobin A1c ordered  - Q4h Neurocheck   - Limited TTE with bubble study ordered to assess for PFO/atrial septal aneurysm, thrombus or vegetation  - Permissive hypertension less than 220/120  - Neurology consulted, appreciate recs  - PT/OT consult    #One episode of hematemesis  #History of YAMILE not on iron supplements  #Hx of thalassemia minor  -Hemoglobin of 7.3 on admission  -On chart review, patient's hemoglobin was 7.7 in 2024 and 11 in 2023, iron studies indicative of YAMILE  -EGD 3/7/2023 showed normal  duodenum, erosive gastropathy with no bleeding and no stigmata of recent bleeding,10 cm hiatal hernia, LA Grade B reflux esophagitis with no bleeding.   -Monitor hemoglobin on morning labs and for any overt signs of bleeding  -Transfuse if hemoglobin less than 7    CHRONIC PROBLEMS:  #Symptomatic paraesoph hernia  #GERD  #Lumbar stenosis  #Hx of DVT and PVD  #Traumatic blindness with L prosthetic eye  - Continue home medications as appropriate    Global Plan of Care  IVF: as needed  Electrolytes: Replete with goal K>4 and Mg>2  Diet: Cardiac  DVT prophylaxis: Subcutaneous Lovenox  Bowel regime: None in the setting of diarrhea  Consults: Neurology, PT/OT  Code Status: FULL    Dispo: Anticipate 1-2 midnight stays pending further neuro workup and evaluation    Patient seen and discussed with senior resident. To be staffed with attending.    Darcy Redmond, DO  Internal Medicine, PGY 1  March 28, 2025      I saw this patient with the above intern and performed my own History and Physical Examination.   My Subjective and Objective findings are reflected in the above documentation.  The Assessment and Plan reflect my input. My Edits are included in the above documentation.    Gina Sethi, DO  Internal Medicine PGY3

## 2025-03-28 NOTE — HOSPITAL COURSE
Mr. Donald Chowdary is a 64 y.o. male with PMH significant for YAMILE not on iron supplements, thalassemia minor, symptomatic paraesoph hernia, GERD, lumbar stenosis, hx of DVT and PVD, traumatic blindness with L prosthetic eye who presented to [facility] ED on 3/28/25 with an episode of transient AMS. Vitally stable on presentation. CMP was grossly unremarkable and CBC was remarkable for hemoglobin of 7.3 (Hgb in April 2024 was 7.7 on chart review). CT head and CTA head and neck were negative for any acute intracranial abnormalities. Patient was evaluated by telestroke neurologist in the emergency who had low suspicion for stroke. No focal neuro deficits on exam and patient had NIHSS of 0. Patient was admitted to [facility] with telemetry for further TIA workup.     Patient remained hemodynamically stable overnight. He received MRI brain which was negative. His morning CBC was significant for microcytic anemia with hemoglobin of 7.3. Iron workup was repeated here in the hospital, showing ferritin of 8, iron of 10, TIBC 430, percent saturation of 2. Patient received one Venofer infusion and was provided with a referral to hematology for his YAMILE and Thalassemia as he does not follow with a hematologist outpatient. Transient AMS episode likely secondary to anemia, increased stress, and insomnia. He was evaluated by neurologist who agreed and cleared patient for discharge with recommendation of outpatient sleep study. Patient will be discharged home with prescription for ferrous sulfate to be taken every other day and instructions to follow-up with his primary care provider within 1 week as well as to establish care with hematology. Current discharge plan was discussed with patient. Patient is agreeable and verbalized understanding. All questions were answered and patient is medically stable for discharge.

## 2025-03-29 VITALS
SYSTOLIC BLOOD PRESSURE: 121 MMHG | WEIGHT: 198.63 LBS | HEART RATE: 77 BPM | OXYGEN SATURATION: 95 % | HEIGHT: 71 IN | RESPIRATION RATE: 17 BRPM | TEMPERATURE: 98.2 F | DIASTOLIC BLOOD PRESSURE: 80 MMHG | BODY MASS INDEX: 27.81 KG/M2

## 2025-03-29 LAB
ALBUMIN SERPL BCP-MCNC: 4.1 G/DL (ref 3.4–5)
ANION GAP SERPL CALC-SCNC: 10 MMOL/L (ref 10–20)
ATRIAL RATE: 76 BPM
BUN SERPL-MCNC: 25 MG/DL (ref 6–23)
CALCIUM SERPL-MCNC: 9.1 MG/DL (ref 8.6–10.3)
CHLORIDE SERPL-SCNC: 106 MMOL/L (ref 98–107)
CO2 SERPL-SCNC: 26 MMOL/L (ref 21–32)
CREAT SERPL-MCNC: 1.05 MG/DL (ref 0.5–1.3)
EGFRCR SERPLBLD CKD-EPI 2021: 80 ML/MIN/1.73M*2
ERYTHROCYTE [DISTWIDTH] IN BLOOD BY AUTOMATED COUNT: 20.5 % (ref 11.5–14.5)
EST. AVERAGE GLUCOSE BLD GHB EST-MCNC: 114 MG/DL
FERRITIN SERPL-MCNC: 8 NG/ML (ref 20–300)
GLUCOSE SERPL-MCNC: 95 MG/DL (ref 74–99)
HBA1C MFR BLD: 5.6 %
HCT VFR BLD AUTO: 25.5 % (ref 41–52)
HGB BLD-MCNC: 7 G/DL (ref 13.5–17.5)
HOLD SPECIMEN: NORMAL
IRON SATN MFR SERPL: 2 % (ref 25–45)
IRON SERPL-MCNC: 10 UG/DL (ref 35–150)
MAGNESIUM SERPL-MCNC: 2.15 MG/DL (ref 1.6–2.4)
MCH RBC QN AUTO: 17 PG (ref 26–34)
MCHC RBC AUTO-ENTMCNC: 27.5 G/DL (ref 32–36)
MCV RBC AUTO: 62 FL (ref 80–100)
NRBC BLD-RTO: 0 /100 WBCS (ref 0–0)
P AXIS: 38 DEGREES
P OFFSET: 195 MS
P ONSET: 135 MS
PHOSPHATE SERPL-MCNC: 4.5 MG/DL (ref 2.5–4.9)
PLATELET # BLD AUTO: 313 X10*3/UL (ref 150–450)
POTASSIUM SERPL-SCNC: 4.4 MMOL/L (ref 3.5–5.3)
PR INTERVAL: 168 MS
Q ONSET: 219 MS
QRS COUNT: 13 BEATS
QRS DURATION: 100 MS
QT INTERVAL: 456 MS
QTC CALCULATION(BAZETT): 513 MS
QTC FREDERICIA: 493 MS
R AXIS: 38 DEGREES
RBC # BLD AUTO: 4.12 X10*6/UL (ref 4.5–5.9)
SODIUM SERPL-SCNC: 138 MMOL/L (ref 136–145)
T AXIS: 28 DEGREES
T OFFSET: 447 MS
TIBC SERPL-MCNC: 430 UG/DL (ref 240–445)
UIBC SERPL-MCNC: 420 UG/DL (ref 110–370)
VENTRICULAR RATE: 76 BPM
VIT B12 SERPL-MCNC: 671 PG/ML (ref 211–911)
WBC # BLD AUTO: 4.5 X10*3/UL (ref 4.4–11.3)

## 2025-03-29 PROCEDURE — 2500000001 HC RX 250 WO HCPCS SELF ADMINISTERED DRUGS (ALT 637 FOR MEDICARE OP)

## 2025-03-29 PROCEDURE — 99233 SBSQ HOSP IP/OBS HIGH 50: CPT | Performed by: SPECIALIST

## 2025-03-29 PROCEDURE — 80069 RENAL FUNCTION PANEL: CPT

## 2025-03-29 PROCEDURE — 2500000002 HC RX 250 W HCPCS SELF ADMINISTERED DRUGS (ALT 637 FOR MEDICARE OP, ALT 636 FOR OP/ED)

## 2025-03-29 PROCEDURE — 2500000004 HC RX 250 GENERAL PHARMACY W/ HCPCS (ALT 636 FOR OP/ED): Performed by: STUDENT IN AN ORGANIZED HEALTH CARE EDUCATION/TRAINING PROGRAM

## 2025-03-29 PROCEDURE — 99239 HOSP IP/OBS DSCHRG MGMT >30: CPT

## 2025-03-29 PROCEDURE — 82728 ASSAY OF FERRITIN: CPT | Performed by: STUDENT IN AN ORGANIZED HEALTH CARE EDUCATION/TRAINING PROGRAM

## 2025-03-29 PROCEDURE — 83540 ASSAY OF IRON: CPT | Performed by: STUDENT IN AN ORGANIZED HEALTH CARE EDUCATION/TRAINING PROGRAM

## 2025-03-29 PROCEDURE — G0378 HOSPITAL OBSERVATION PER HR: HCPCS

## 2025-03-29 PROCEDURE — 36415 COLL VENOUS BLD VENIPUNCTURE: CPT

## 2025-03-29 PROCEDURE — 83735 ASSAY OF MAGNESIUM: CPT

## 2025-03-29 PROCEDURE — 85027 COMPLETE CBC AUTOMATED: CPT

## 2025-03-29 RX ORDER — FERROUS SULFATE 325(65) MG
325 TABLET, DELAYED RELEASE (ENTERIC COATED) ORAL EVERY OTHER DAY
Qty: 15 TABLET | Refills: 0 | Status: SHIPPED | OUTPATIENT
Start: 2025-03-29 | End: 2025-04-28

## 2025-03-29 RX ADMIN — ASPIRIN 81 MG: 81 TABLET, COATED ORAL at 09:07

## 2025-03-29 RX ADMIN — PANTOPRAZOLE SODIUM 40 MG: 40 TABLET, DELAYED RELEASE ORAL at 06:00

## 2025-03-29 RX ADMIN — TAMSULOSIN HYDROCHLORIDE 0.4 MG: 0.4 CAPSULE ORAL at 09:07

## 2025-03-29 RX ADMIN — SODIUM CHLORIDE 300 MG: 9 INJECTION, SOLUTION INTRAVENOUS at 10:24

## 2025-03-29 SDOH — ECONOMIC STABILITY: FOOD INSECURITY: WITHIN THE PAST 12 MONTHS, YOU WORRIED THAT YOUR FOOD WOULD RUN OUT BEFORE YOU GOT THE MONEY TO BUY MORE.: NEVER TRUE

## 2025-03-29 SDOH — ECONOMIC STABILITY: INCOME INSECURITY: IN THE PAST 12 MONTHS HAS THE ELECTRIC, GAS, OIL, OR WATER COMPANY THREATENED TO SHUT OFF SERVICES IN YOUR HOME?: NO

## 2025-03-29 SDOH — ECONOMIC STABILITY: FOOD INSECURITY: WITHIN THE PAST 12 MONTHS, THE FOOD YOU BOUGHT JUST DIDN'T LAST AND YOU DIDN'T HAVE MONEY TO GET MORE.: NEVER TRUE

## 2025-03-29 SDOH — ECONOMIC STABILITY: HOUSING INSECURITY: AT ANY TIME IN THE PAST 12 MONTHS, WERE YOU HOMELESS OR LIVING IN A SHELTER (INCLUDING NOW)?: NO

## 2025-03-29 SDOH — ECONOMIC STABILITY: HOUSING INSECURITY: IN THE PAST 12 MONTHS, HOW MANY TIMES HAVE YOU MOVED WHERE YOU WERE LIVING?: 0

## 2025-03-29 SDOH — ECONOMIC STABILITY: HOUSING INSECURITY: IN THE LAST 12 MONTHS, WAS THERE A TIME WHEN YOU WERE NOT ABLE TO PAY THE MORTGAGE OR RENT ON TIME?: NO

## 2025-03-29 SDOH — ECONOMIC STABILITY: TRANSPORTATION INSECURITY: IN THE PAST 12 MONTHS, HAS LACK OF TRANSPORTATION KEPT YOU FROM MEDICAL APPOINTMENTS OR FROM GETTING MEDICATIONS?: NO

## 2025-03-29 SDOH — ECONOMIC STABILITY: FOOD INSECURITY: HOW HARD IS IT FOR YOU TO PAY FOR THE VERY BASICS LIKE FOOD, HOUSING, MEDICAL CARE, AND HEATING?: NOT HARD AT ALL

## 2025-03-29 ASSESSMENT — PAIN SCALES - GENERAL
PAINLEVEL_OUTOF10: 0 - NO PAIN

## 2025-03-29 ASSESSMENT — ACTIVITIES OF DAILY LIVING (ADL): LACK_OF_TRANSPORTATION: NO

## 2025-03-29 ASSESSMENT — PAIN - FUNCTIONAL ASSESSMENT
PAIN_FUNCTIONAL_ASSESSMENT: 0-10
PAIN_FUNCTIONAL_ASSESSMENT: 0-10

## 2025-03-29 NOTE — PROGRESS NOTES
Physical Therapy                 Therapy Communication Note    Patient Name: Donald Chowdary  MRN: [id number]  Department: [facility] [address]  Room: [facility]  Today's Date: 3/29/2025     Discipline: Physical Therapy      Missed Time: Attempt    Comment: Functional Screen completed. Pt has been IND in room. Demonstrates independence with mobility. Reports symptoms have resolved. No acute skilled PT needs. No DME needs. Will dc PT order.
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
## 2025-03-29 NOTE — PROGRESS NOTES
03/29/25 1353   Discharge Planning   Living Arrangements Alone   Support Systems Children;Family members   Assistance Needed none   Type of Residence Private residence   Who is requesting discharge planning? Provider   Home or Post Acute Services None   Expected Discharge Disposition Home   Does the patient need discharge transport arranged? No   Financial Resource Strain   How hard is it for you to pay for the very basics like food, housing, medical care, and heating? Not hard   Housing Stability   In the last 12 months, was there a time when you were not able to pay the mortgage or rent on time? N   In the past 12 months, how many times have you moved where you were living? 0   At any time in the past 12 months, were you homeless or living in a shelter (including now)? N   Transportation Needs   In the past 12 months, has lack of transportation kept you from medical appointments or from getting medications? no   In the past 12 months, has lack of transportation kept you from meetings, work, or from getting things needed for daily living? No   Patient Choice   Patient / Family choosing to utilize agency / facility established prior to hospitalization No   Stroke Family Assessment   Stroke Family Assessment Needed No   Intensity of Service   Intensity of Service 0-30 min     Met with patient and sons at bedside. Introduced self and role in hospital. Verified address, insurance and PCP is Dr. Meyer through the [facility]. Uses [facility] in [place] for medications and has no issues in obtaining/paying for them and manages his own medications. Does not use DME, still drives, still works and is independent with ADL's. Patient feels that he is able to manage his health at home and will return home on discharge with no needs. CT team to follow.

## 2025-03-29 NOTE — DISCHARGE SUMMARY
Discharge Diagnosis  Altered mental status, unspecified altered mental status type    Issues Requiring Follow-Up  Start taking ferrous sulfate po every other day  Follow up with PCP for hospitalization follow-up and discuss outpatient sleep study evaluation  Establish care with hematology for YAMILE and Thalassemia     Discharge Meds     Medication List      START taking these medications     ferrous sulfate 325 (65 Fe) mg EC tablet; Take 1 tablet by mouth every   other day. Do not crush, chew, or split.     CONTINUE taking these medications     Flomax 0.4 mg 24 hr capsule; Generic drug: tamsulosin   hydrOXYzine HCL 25 mg tablet; Commonly known as: Atarax   omeprazole 40 mg DR capsule; Commonly known as: PriLOSEC       Test Results Pending At Discharge  Pending Labs       No current pending labs.            Hospital Course  Mr. Donald Chowdary is a 64 y.o. male with PMH significant for YAMILE not on iron supplements, thalassemia minor, symptomatic paraesoph hernia, GERD, lumbar stenosis, hx of DVT and PVD, traumatic blindness with L prosthetic eye who presented to [facility] ED on 3/28/25 with an episode of transient AMS. Vitally stable on presentation. CMP was grossly unremarkable and CBC was remarkable for hemoglobin of 7.3 (Hgb in April 2024 was 7.7 on chart review). CT head and CTA head and neck were negative for any acute intracranial abnormalities. Patient was evaluated by telestroke neurologist in the emergency who had low suspicion for stroke. No focal neuro deficits on exam and patient had NIHSS of 0. Patient was admitted to [facility] with telemetry for further TIA workup.     Patient remained hemodynamically stable overnight. He received MRI brain which was negative. His morning CBC was significant for microcytic anemia with hemoglobin of 7.3. Iron workup was repeated here in the hospital, showing ferritin of 8, iron of 10, TIBC 430, percent saturation of 2. Patient received one Venofer infusion and was provided with a  referral to hematology for his YAMILE and Thalassemia as he does not follow with a hematologist outpatient. Transient AMS episode likely secondary to anemia, increased stress, and insomnia. He was evaluated by neurologist who agreed and cleared patient for discharge with recommendation of outpatient sleep study. Patient will be discharged home with prescription for ferrous sulfate to be taken every other day and instructions to follow-up with his primary care provider within 1 week as well as to establish care with hematology. Current discharge plan was discussed with patient. Patient is agreeable and verbalized understanding. All questions were answered and patient is medically stable for discharge.     Pertinent Physical Exam At Time of Discharge  Physical Exam  Constitutional:       General: He is not in acute distress.     Appearance: Normal appearance.   HENT:      Head: Normocephalic and atraumatic.      Mouth/Throat:      Mouth: Mucous membranes are moist.      Pharynx: Oropharynx is clear.   Cardiovascular:      Rate and Rhythm: Normal rate and regular rhythm.      Pulses: Normal pulses.   Pulmonary:      Effort: Pulmonary effort is normal. No respiratory distress.      Breath sounds: Normal breath sounds. No wheezing.   Abdominal:      General: Bowel sounds are normal. There is no distension.      Palpations: Abdomen is soft.      Tenderness: There is no abdominal tenderness.   Musculoskeletal:      Right lower leg: No edema.      Left lower leg: No edema.   Skin:     General: Skin is warm and dry.      Capillary Refill: Capillary refill takes less than 2 seconds.   Neurological:      Mental Status: He is alert and oriented to person, place, and time. Mental status is at baseline.         Outpatient Follow-Up  No future appointments.      Darcy Redmond, DO  Internal Medicine PGY1

## 2025-03-29 NOTE — CARE PLAN
Problem: General Stroke  Goal: Demonstrate improvement in neurological exam throughout the shift  Outcome: Progressing     Problem: Pain - Adult  Goal: Verbalizes/displays adequate comfort level or baseline comfort level  Outcome: Progressing     Problem: Safety - Adult  Goal: Free from fall injury  Outcome: Progressing     Problem: Nutrition  Goal: Nutrient intake appropriate for maintaining nutritional needs  Outcome: Progressing    The patient's goals for the shift include  get to go home tomorrow    The clinical goals for the shift include patient to maintain safety throughout end of shift    EOS: Patient had uneventful shift overall. Patient Aox4, RA, independent in room. Patient endorsing that all his symptoms he originally presented with are resolved, no longer having a headache, short term memory loss, or balance issues. Patient steadily walking in the room. Neurochecks all WNL with the exception of L eye blindness/prosthesis. Patient hopeful to be discharged after neurology consult sees him this AM. Safety maintained.

## 2025-03-29 NOTE — PROGRESS NOTES
Occupational Therapy                 Therapy Communication Note    Patient Name: Donald Chowdary  MRN: [id number]  Department: [facility] 3 N  Room: [facility]-A  Today's Date: 3/29/2025     Discipline: Occupational Therapy    Screen: Chart review completed.  Functional screen completed at 1115.  Patient is independent with functional mobility tasks in room and ADLs. Denies OT needs.  Reports is feeling better than time of admission. Will discharge OT order at this time.

## 2025-03-29 NOTE — CARE PLAN
Patient had uneventful shift overall. Pt aox4, RA, independent in room. Patient endorsing that all symptoms he originally presented prior has resolved. Pt denied pain. Neuro checks all WNL with exception of L eye blindness with a prosthesis.     Pt discharge to home via personal transportation. Pt educated on discharge instructions, new medications, upcoming appointments, reading on stroke and when to call 911. Pt verablized understanding and had no questions/ concerns.    Iv taken out with pressure and pressure dressing applied.     Pt stable at discharge, denies pain. VSS.

## 2025-03-30 NOTE — ASSESSMENT & PLAN NOTE
Confusional Migraine with recent worsening by a history of Anemia and Thalassemia, Sleep Disturbances,  and insomnia, with normal brain MRI. .    - Plan:    - To optimize the treatment of anemia, insomnia, obstructive sleep apnea.     -  Weigh loss with BMI < 25    - Monitor Blood pressure     - To exercise in moderation.

## 2025-04-06 LAB
ATRIAL RATE: 76 BPM
P AXIS: 38 DEGREES
P OFFSET: 195 MS
P ONSET: 135 MS
PR INTERVAL: 168 MS
Q ONSET: 219 MS
QRS COUNT: 13 BEATS
QRS DURATION: 100 MS
QT INTERVAL: 456 MS
QTC CALCULATION(BAZETT): 513 MS
QTC FREDERICIA: 493 MS
R AXIS: 38 DEGREES
T AXIS: 28 DEGREES
T OFFSET: 447 MS
VENTRICULAR RATE: 76 BPM

## 2025-06-13 ENCOUNTER — OFFICE VISIT (OUTPATIENT)
Dept: HEMATOLOGY/ONCOLOGY | Facility: CLINIC | Age: 65
End: 2025-06-13
Payer: MEDICAID

## 2025-06-13 ENCOUNTER — LAB (OUTPATIENT)
Dept: LAB | Facility: CLINIC | Age: 65
End: 2025-06-13
Payer: MEDICAID

## 2025-06-13 VITALS
BODY MASS INDEX: 29.49 KG/M2 | HEIGHT: 69 IN | SYSTOLIC BLOOD PRESSURE: 137 MMHG | HEART RATE: 73 BPM | TEMPERATURE: 97.5 F | RESPIRATION RATE: 16 BRPM | WEIGHT: 199.1 LBS | OXYGEN SATURATION: 96 % | DIASTOLIC BLOOD PRESSURE: 87 MMHG

## 2025-06-13 DIAGNOSIS — D50.9 IRON DEFICIENCY ANEMIA, UNSPECIFIED IRON DEFICIENCY ANEMIA TYPE: Primary | ICD-10-CM

## 2025-06-13 DIAGNOSIS — D50.9 IRON DEFICIENCY ANEMIA, UNSPECIFIED IRON DEFICIENCY ANEMIA TYPE: ICD-10-CM

## 2025-06-13 LAB
ALBUMIN SERPL BCP-MCNC: 4.9 G/DL (ref 3.4–5)
ALP SERPL-CCNC: 56 U/L (ref 33–136)
ALT SERPL W P-5'-P-CCNC: 14 U/L (ref 10–52)
ANION GAP SERPL CALC-SCNC: 14 MMOL/L (ref 10–20)
AST SERPL W P-5'-P-CCNC: 16 U/L (ref 9–39)
BASOPHILS # BLD AUTO: 0.04 X10*3/UL (ref 0–0.1)
BASOPHILS NFR BLD AUTO: 0.8 %
BILIRUB SERPL-MCNC: 0.5 MG/DL (ref 0–1.2)
BUN SERPL-MCNC: 21 MG/DL (ref 6–23)
CALCIUM SERPL-MCNC: 10 MG/DL (ref 8.6–10.3)
CHLORIDE SERPL-SCNC: 103 MMOL/L (ref 98–107)
CO2 SERPL-SCNC: 28 MMOL/L (ref 21–32)
CREAT SERPL-MCNC: 0.95 MG/DL (ref 0.5–1.3)
EGFRCR SERPLBLD CKD-EPI 2021: 90 ML/MIN/1.73M*2
EOSINOPHIL # BLD AUTO: 0.17 X10*3/UL (ref 0–0.7)
EOSINOPHIL NFR BLD AUTO: 3.4 %
ERYTHROCYTE [DISTWIDTH] IN BLOOD BY AUTOMATED COUNT: 19.1 % (ref 11.5–14.5)
GLUCOSE SERPL-MCNC: 99 MG/DL (ref 74–99)
HCT VFR BLD AUTO: 32.6 % (ref 41–52)
HGB BLD-MCNC: 9.3 G/DL (ref 13.5–17.5)
IMM GRANULOCYTES # BLD AUTO: 0.01 X10*3/UL (ref 0–0.7)
IMM GRANULOCYTES NFR BLD AUTO: 0.2 % (ref 0–0.9)
LYMPHOCYTES # BLD AUTO: 1.65 X10*3/UL (ref 1.2–4.8)
LYMPHOCYTES NFR BLD AUTO: 33.1 %
MCH RBC QN AUTO: 18.5 PG (ref 26–34)
MCHC RBC AUTO-ENTMCNC: 28.5 G/DL (ref 32–36)
MCV RBC AUTO: 65 FL (ref 80–100)
MONOCYTES # BLD AUTO: 0.44 X10*3/UL (ref 0.1–1)
MONOCYTES NFR BLD AUTO: 8.8 %
NEUTROPHILS # BLD AUTO: 2.68 X10*3/UL (ref 1.2–7.7)
NEUTROPHILS NFR BLD AUTO: 53.7 %
NRBC BLD-RTO: ABNORMAL /100{WBCS}
PLATELET # BLD AUTO: 277 X10*3/UL (ref 150–450)
POTASSIUM SERPL-SCNC: 4.6 MMOL/L (ref 3.5–5.3)
PROT SERPL-MCNC: 8.5 G/DL (ref 6.4–8.2)
RBC # BLD AUTO: 5.03 X10*6/UL (ref 4.5–5.9)
SODIUM SERPL-SCNC: 140 MMOL/L (ref 136–145)
WBC # BLD AUTO: 5 X10*3/UL (ref 4.4–11.3)

## 2025-06-13 PROCEDURE — 83540 ASSAY OF IRON: CPT

## 2025-06-13 PROCEDURE — 86140 C-REACTIVE PROTEIN: CPT

## 2025-06-13 PROCEDURE — 99204 OFFICE O/P NEW MOD 45 MIN: CPT | Performed by: PHYSICIAN ASSISTANT

## 2025-06-13 PROCEDURE — 86334 IMMUNOFIX E-PHORESIS SERUM: CPT

## 2025-06-13 PROCEDURE — 83521 IG LIGHT CHAINS FREE EACH: CPT

## 2025-06-13 PROCEDURE — 3008F BODY MASS INDEX DOCD: CPT | Performed by: PHYSICIAN ASSISTANT

## 2025-06-13 PROCEDURE — 99214 OFFICE O/P EST MOD 30 MIN: CPT | Mod: 25 | Performed by: PHYSICIAN ASSISTANT

## 2025-06-13 PROCEDURE — 1036F TOBACCO NON-USER: CPT | Performed by: PHYSICIAN ASSISTANT

## 2025-06-13 PROCEDURE — 86038 ANTINUCLEAR ANTIBODIES: CPT

## 2025-06-13 PROCEDURE — 82728 ASSAY OF FERRITIN: CPT

## 2025-06-13 PROCEDURE — 84165 PROTEIN E-PHORESIS SERUM: CPT

## 2025-06-13 PROCEDURE — 82784 ASSAY IGA/IGD/IGG/IGM EACH: CPT

## 2025-06-13 PROCEDURE — 36415 COLL VENOUS BLD VENIPUNCTURE: CPT

## 2025-06-13 PROCEDURE — 83020 HEMOGLOBIN ELECTROPHORESIS: CPT | Performed by: PATHOLOGY

## 2025-06-13 PROCEDURE — 83010 ASSAY OF HAPTOGLOBIN QUANT: CPT

## 2025-06-13 PROCEDURE — 84155 ASSAY OF PROTEIN SERUM: CPT

## 2025-06-13 PROCEDURE — 86880 COOMBS TEST DIRECT: CPT

## 2025-06-13 PROCEDURE — 84443 ASSAY THYROID STIM HORMONE: CPT

## 2025-06-13 PROCEDURE — 83021 HEMOGLOBIN CHROMOTOGRAPHY: CPT

## 2025-06-13 PROCEDURE — 85045 AUTOMATED RETICULOCYTE COUNT: CPT

## 2025-06-13 PROCEDURE — 86235 NUCLEAR ANTIGEN ANTIBODY: CPT

## 2025-06-13 PROCEDURE — 86431 RHEUMATOID FACTOR QUANT: CPT

## 2025-06-13 PROCEDURE — 82668 ASSAY OF ERYTHROPOIETIN: CPT

## 2025-06-13 PROCEDURE — 83615 LACTATE (LD) (LDH) ENZYME: CPT

## 2025-06-13 PROCEDURE — 82746 ASSAY OF FOLIC ACID SERUM: CPT

## 2025-06-13 PROCEDURE — 80053 COMPREHEN METABOLIC PANEL: CPT

## 2025-06-13 PROCEDURE — 85652 RBC SED RATE AUTOMATED: CPT

## 2025-06-13 PROCEDURE — 85025 COMPLETE CBC W/AUTO DIFF WBC: CPT

## 2025-06-13 PROCEDURE — 82607 VITAMIN B-12: CPT

## 2025-06-13 ASSESSMENT — PATIENT HEALTH QUESTIONNAIRE - PHQ9
1. LITTLE INTEREST OR PLEASURE IN DOING THINGS: NOT AT ALL
2. FEELING DOWN, DEPRESSED OR HOPELESS: NOT AT ALL
SUM OF ALL RESPONSES TO PHQ9 QUESTIONS 1 AND 2: 0

## 2025-06-13 ASSESSMENT — ENCOUNTER SYMPTOMS
OCCASIONAL FEELINGS OF UNSTEADINESS: 0
DEPRESSION: 0
LOSS OF SENSATION IN FEET: 0

## 2025-06-13 ASSESSMENT — COLUMBIA-SUICIDE SEVERITY RATING SCALE - C-SSRS
2. HAVE YOU ACTUALLY HAD ANY THOUGHTS OF KILLING YOURSELF?: NO
6. HAVE YOU EVER DONE ANYTHING, STARTED TO DO ANYTHING, OR PREPARED TO DO ANYTHING TO END YOUR LIFE?: NO
1. IN THE PAST MONTH, HAVE YOU WISHED YOU WERE DEAD OR WISHED YOU COULD GO TO SLEEP AND NOT WAKE UP?: NO

## 2025-06-13 ASSESSMENT — PAIN SCALES - GENERAL: PAINLEVEL_OUTOF10: 0-NO PAIN

## 2025-06-13 NOTE — PROGRESS NOTES
Patient ID: Donald Chowdary is a 64 y.o. male.  Referring Physician: Ernst Senior MD  [id number] [place] [address]  Primary Care Provider: Sendy Hurt MD  Visit Type: Initial Visit    Location: [facility]  Diagnosis/Reason: Anemia    HPI:  Donald Chowdary is a 64 y.o. male referred for consultation of anemia    Per review of records:  Persistently microcytic, predominantly hypochromic anemia since at least 12/20/19 w/ Hb range of 7.0-11.4 since then   RBC counts low since 3/28/25, Hct's low since 12/20/19, RDW's persistently elevated    Previous Hematological Background:  Hx of hematological disorders: Yes - Patient reports a hematologic history of thalassemia  Hx of blood transfusions:   Hx of iron supplementation: Yes - Oral supplementation - C/o dark stools and constipation - Agent: FeSO4  Hx of B12 supplementation: Yes - Prior oral supplementation - Denies adverse effect and reaction  Hx of folate supplementation: No - Denies any prior supplementation    Relevant medications:  Omeprazole 40mg QD    Patient denies weight loss, abnormal bruising and bleeding, hematuria, blood in stool, dark/black stools, epistaxis, oral/gingival bleeding, lymphadenopathy, recurrent infections, recurrent fevers, night sweats, early satiety, abdominal pain, bone pain, chest pain, palpitations, SOB, MINOR, fatigue, dizziness, lightheadedness, PICA.    PMHx:  Active Ambulatory Problems     Diagnosis Date Noted    Altered mental status, unspecified altered mental status type 03/28/2025     Resolved Ambulatory Problems     Diagnosis Date Noted    No Resolved Ambulatory Problems     Past Medical History:   Diagnosis Date    Personal history of other diseases of the circulatory system 02/19/2021    Personal history of other venous thrombosis and embolism 02/19/2021    Unspecified injury of left eye and orbit, initial encounter 02/19/2021      PSHx:  Past Surgical History:   Procedure Laterality Date     OTHER SURGICAL HISTORY  02/19/2021    Endovenous laser ablation      Hiatal hernia - At [facility] approximately [id number]  Hernia repair surgery approximately 2000    FHx:  Father: Thalassemia trait  Siblings: Sister w/ thalassemia trait, brother w/ thalassemia trait  Children: 1 son w/ thalassemia trait    Social Hx:  Donald Chowdary    reports that he has never smoked. He has never been exposed to tobacco smoke. He has never used smokeless tobacco.  He  has no history on file for alcohol use.  He  has no history on file for drug use.  Social History     Socioeconomic History    Marital status:    Tobacco Use    Smoking status: Never     Passive exposure: Never    Smokeless tobacco: Never     Social Drivers of Health     Financial Resource Strain: Low Risk  (3/29/2025)    Overall Financial Resource Strain (CARDIA)     Difficulty of Paying Living Expenses: Not hard at all   Food Insecurity: No Food Insecurity (3/29/2025)    Hunger Vital Sign     Worried About Running Out of Food in the Last Year: Never true     Ran Out of Food in the Last Year: Never true   Transportation Needs: No Transportation Needs (3/29/2025)    PRAPARE - Transportation     Lack of Transportation (Medical): No     Lack of Transportation (Non-Medical): No   Physical Activity: Insufficiently Active (3/5/2025)    Received from [facility]    Exercise Vital Sign     Days of Exercise per Week: 1 day     Minutes of Exercise per Session: 10 min   Stress: No Stress Concern Present (3/5/2025)    Received from [facility]    [place] [place] [employer] - Occupational Stress Questionnaire     Feeling of Stress : Not at all   Social Connections: Moderately Isolated (3/5/2025)    Received from [facility]    Social Connection and Isolation Panel [NHANES]     Frequency of Communication with Friends and Family: More than three times a week     Frequency of Social Gatherings with Friends and Family: Twice a week     Attends  Hoahaoism Services: More than 4 times per year     Active Member of Clubs or Organizations: No     Attends Club or Organization Meetings: Never     Marital Status:    Intimate Partner Violence: Not At Risk (3/28/2025)    Humiliation, Afraid, Rape, and Kick questionnaire     Fear of Current or Ex-Partner: No     Emotionally Abused: No     Physically Abused: No     Sexually Abused: No   Housing Stability: Low Risk  (3/29/2025)    Housing Stability Vital Sign     Unable to Pay for Housing in the Last Year: No     Number of Times Moved in the Last Year: 0     Homeless in the Last Year: No      Living Situation: Lives at home alone  Occupation: Works as full-time   Marital Status: Single  Alcohol Use: Socially  Smoking: Never smoker  Recreational Drug Use: Denies  Special Diets: Regular Diet    Cancer Screenings:  Upper EGD: No record in EMR  Colonoscopy: No record in EMR   Prostate/PSA screenings: No record in EMR  Lung cancer screenings: No record in EMR    Medications and allergies reviewed in EMR.    ROS:  Review of Systems - Oncology   10 point review of systems negative except as state in HPI.    Vitals & Statistics:  Objective   BSA: There is no height or weight on file to calculate BSA.  There were no vitals taken for this visit.    Physical Exam:  Physical Exam  Vitals and nursing note reviewed.   Constitutional:       Appearance: Normal appearance.   HENT:      Head: Normocephalic and atraumatic.      Right Ear: External ear normal.      Left Ear: External ear normal.      Nose: Nose normal.      Mouth/Throat:      Mouth: Mucous membranes are moist.      Pharynx: Oropharynx is clear.   Eyes:      General: Lids are normal.      Extraocular Movements: Extraocular movements intact.      Conjunctiva/sclera: Conjunctivae normal.      Pupils: Pupils are equal, round, and reactive to light.   Cardiovascular:      Rate and Rhythm: Normal rate and regular rhythm.      Pulses: Normal pulses.      Heart  "sounds: Normal heart sounds.   Pulmonary:      Effort: Pulmonary effort is normal.      Breath sounds: Normal breath sounds.   Abdominal:      General: Abdomen is flat. Bowel sounds are normal.      Palpations: Abdomen is soft.      Comments: No masses or organomegaly upon physical exam   Musculoskeletal:         General: Normal range of motion.      Cervical back: Normal range of motion.   Lymphadenopathy:      Comments: No lymphadenopathy palpable on physical exam   Skin:     General: Skin is warm and dry.      Capillary Refill: Capillary refill takes less than 2 seconds.   Neurological:      General: No focal deficit present.      Mental Status: He is alert and oriented to person, place, and time.   Psychiatric:         Mood and Affect: Mood normal.         Behavior: Behavior normal.         Thought Content: Thought content normal.         Judgment: Judgment normal.     Results:  Lab Results   Component Value Date    WBC 4.5 03/29/2025    NEUTROABS 3.43 12/28/2020    IGABSOL 0.01 03/28/2025    LYMPHSABS 1.43 12/28/2020    MONOSABS 0.53 12/28/2020    EOSABS 0.26 03/28/2025    BASOSABS 0.10 03/28/2025    RBC 4.12 (L) 03/29/2025    MCV 62 (L) 03/29/2025    MCHC 27.5 (L) 03/29/2025    HGB 7.0 (L) 03/29/2025    HCT 25.5 (L) 03/29/2025     03/29/2025     No results found for: \"RETICCTPCT\"   Lab Results   Component Value Date    CREATININE 1.05 03/29/2025    BUN 25 (H) 03/29/2025    EGFR 80 03/29/2025     03/29/2025    K 4.4 03/29/2025     03/29/2025    CO2 26 03/29/2025      Lab Results   Component Value Date    ALT 14 03/28/2025    AST 16 03/28/2025    ALKPHOS 45 03/28/2025    BILITOT 0.4 03/28/2025      Lab Results   Component Value Date    TSH 1.36 03/28/2025     Lab Results   Component Value Date    TSH 1.36 03/28/2025     Lab Results   Component Value Date    IRON 10 (L) 03/29/2025    TIBC 430 03/29/2025    FERRITIN 8 (L) 03/29/2025      Lab Results   Component Value Date    [id number] 671 " "03/28/2025      No results found for: \"FOLATE\"  No results found for: \"JUNITO\", \"RF\", \"SEDRATE\"   Lab Results   Component Value Date    CRP 0.26 03/28/2025      No results found for: \"ANA\"  No results found for: \"LDH\"  No results found for: \"HAPTOGLOBIN\"  No results found for: \"SPEP\"  No results found for: \"IGG\", \"IGM\", \"IGA\"  No results found for: \"HEPATOT\", \"HEPAIGM\", \"HEPBCIGM\", \"HEPBCAB\", \"HEPBSAG\", \"HEPCAB\"  No results found for: \"HIV1X2\"    Assessment:  Donald Chowdary is a 64 y.o. male referred for consultation of anemia    I reviewed patient's chart including but not limited to labs, imaging, surgical/procedure notes, pathology, hospital notes, doctor's notes.    Relevant historical labs/imaging:  3/28/25  TSH: WNL    6/13/25 results:  WBC count & Differential WNL  MC/HC anemia w/ Hb at 9.3 - RBC count & Hct low - RDW elevated  Platelets WNL  Remaining results pending at time of writing    Plan:  Discussed possible etiologies including multifactorial, nutritional deficiencies, anemia of chronic disease, malabsorption, hemopathy, medications, bleeding, malignancy, etc. Will start hematological workup today.    Anemia  Lab results pending - Will review when available and address adverse results as needed  RTC in 2-4 weeks via virtual/telehealth visit - F/U sooner if needed/urgent    I had an extensive discussion with the patient regarding the diagnosis and discussed the plan of therapy, including general considerations regarding side effects and outcomes. Pt understood and gave appropriate teach back about the plan of care. All questions were answered to the patient's satisfaction. The patient is instructed to contact us at any time if questions or problems arise. Thank you for the opportunity to participate in the care of this very pleasant patient.    Total time = 80 minutes. 50% or more of this time was spent in counseling and/or coordination of care including reviewing medical history/radiology/labs, examining " patient, formulating outlined plan with team, and discussing plan with patient/family.    Casey Moreira PA-C

## 2025-06-14 LAB
CRP SERPL-MCNC: 0.19 MG/DL
DAT-POLYSPECIFIC: NORMAL
ERYTHROCYTE [SEDIMENTATION RATE] IN BLOOD BY WESTERGREN METHOD: 17 MM/H (ref 0–20)
FERRITIN SERPL-MCNC: 16 NG/ML (ref 20–300)
FOLATE SERPL-MCNC: >24 NG/ML
HAPTOGLOB SERPL NEPH-MCNC: 180 MG/DL (ref 30–200)
HGB RETIC QN: 19 PG (ref 28–38)
IGA SERPL-MCNC: 321 MG/DL (ref 70–400)
IGG SERPL-MCNC: 1620 MG/DL (ref 700–1600)
IGM SERPL-MCNC: 175 MG/DL (ref 40–230)
IMMATURE RETIC FRACTION: 25 %
IRON SATN MFR SERPL: ABNORMAL %
IRON SERPL-MCNC: 30 UG/DL (ref 35–150)
LDH SERPL L TO P-CCNC: 123 U/L (ref 84–246)
PROT SERPL-MCNC: 8.4 G/DL (ref 6.4–8.2)
RETICS #: 0.06 X10*6/UL (ref 0.02–0.12)
RETICS/RBC NFR AUTO: 1.1 % (ref 0.5–2)
RHEUMATOID FACT SER NEPH-ACNC: 38 IU/ML (ref 0–15)
TIBC SERPL-MCNC: ABNORMAL UG/DL
TSH SERPL-ACNC: 2.2 MIU/L (ref 0.44–3.98)
UIBC SERPL-MCNC: >450 UG/DL (ref 110–370)
VIT B12 SERPL-MCNC: 563 PG/ML (ref 211–911)

## 2025-06-15 LAB
EPO SERPL-ACNC: 70 MU/ML (ref 4–27)
KAPPA LC SERPL-MCNC: 2.76 MG/DL (ref 0.33–1.94)
KAPPA LC/LAMBDA SER: 0.98 {RATIO} (ref 0.26–1.65)
LAMBDA LC SERPL-MCNC: 2.81 MG/DL (ref 0.57–2.63)

## 2025-06-16 LAB
ANA PATTERN: ABNORMAL
ANA SER QL HEP2 SUBST: POSITIVE
ANA TITR SER IF: ABNORMAL {TITER}
CENTROMERE B AB SER-ACNC: <0.2 AI
CHROMATIN AB SERPL-ACNC: <0.2 AI
DSDNA AB SER-ACNC: 1 IU/ML
ENA JO1 AB SER QL IA: <0.2 AI
ENA RNP AB SER IA-ACNC: 0.3 AI
ENA SCL70 AB SER QL IA: <0.2 AI
ENA SM AB SER IA-ACNC: <0.2 AI
ENA SM+RNP AB SER QL IA: <0.2 AI
ENA SS-A AB SER IA-ACNC: <0.2 AI
ENA SS-B AB SER IA-ACNC: <0.2 AI
HEMOGLOBIN A2: 4.2 % (ref 2–3.5)
HEMOGLOBIN A: 94.9 % (ref 95.8–98)
HEMOGLOBIN F: 0.9 % (ref 0–2)
HEMOGLOBIN IDENTIFICATION INTERPRETATION: ABNORMAL
PATH REVIEW-HGB IDENTIFICATION: ABNORMAL
RIBOSOMAL P AB SER-ACNC: <0.2 AI

## 2025-06-19 ENCOUNTER — TELEMEDICINE (OUTPATIENT)
Dept: HEMATOLOGY/ONCOLOGY | Facility: CLINIC | Age: 65
End: 2025-06-19
Payer: MEDICAID

## 2025-06-19 DIAGNOSIS — D50.9 IRON DEFICIENCY ANEMIA, UNSPECIFIED IRON DEFICIENCY ANEMIA TYPE: ICD-10-CM

## 2025-06-19 DIAGNOSIS — M54.50 LUMBAR PAIN: ICD-10-CM

## 2025-06-19 DIAGNOSIS — D47.2 MGUS (MONOCLONAL GAMMOPATHY OF UNKNOWN SIGNIFICANCE): Primary | ICD-10-CM

## 2025-06-19 DIAGNOSIS — R76.8 RHEUMATOID FACTOR POSITIVE: ICD-10-CM

## 2025-06-19 DIAGNOSIS — D56.3 BETA THALASSEMIA MINOR: ICD-10-CM

## 2025-06-19 DIAGNOSIS — K90.9 IRON MALABSORPTION (HHS-HCC): ICD-10-CM

## 2025-06-19 LAB
ALBUMIN: 4.7 G/DL (ref 3.4–5)
ALPHA 1 GLOBULIN: 0.3 G/DL (ref 0.2–0.6)
ALPHA 2 GLOBULIN: 0.7 G/DL (ref 0.4–1.1)
BETA GLOBULIN: 1.1 G/DL (ref 0.5–1.2)
GAMMA GLOBULIN: 1.6 G/DL (ref 0.5–1.4)
IMMUNOFIXATION COMMENT: NORMAL
M-PROTEIN 1: 0.1 G/DL (ref ?–0)
PATH REVIEW - SERUM IMMUNOFIXATION: NORMAL
PATH REVIEW-SERUM PROTEIN ELECTROPHORESIS: ABNORMAL
PROTEIN ELECTROPHORESIS COMMENT: ABNORMAL

## 2025-06-19 PROCEDURE — 99213 OFFICE O/P EST LOW 20 MIN: CPT | Performed by: PHYSICIAN ASSISTANT

## 2025-06-19 RX ORDER — DIPHENHYDRAMINE HYDROCHLORIDE 50 MG/ML
50 INJECTION, SOLUTION INTRAMUSCULAR; INTRAVENOUS AS NEEDED
OUTPATIENT
Start: 2025-07-19

## 2025-06-19 RX ORDER — HEPARIN SODIUM,PORCINE/PF 10 UNIT/ML
50 SYRINGE (ML) INTRAVENOUS AS NEEDED
OUTPATIENT
Start: 2025-06-19

## 2025-06-19 RX ORDER — ALBUTEROL SULFATE 0.83 MG/ML
3 SOLUTION RESPIRATORY (INHALATION) AS NEEDED
OUTPATIENT
Start: 2025-07-19

## 2025-06-19 RX ORDER — EPINEPHRINE 0.3 MG/.3ML
0.3 INJECTION SUBCUTANEOUS EVERY 5 MIN PRN
OUTPATIENT
Start: 2025-07-19

## 2025-06-19 RX ORDER — FAMOTIDINE 10 MG/ML
20 INJECTION, SOLUTION INTRAVENOUS ONCE AS NEEDED
OUTPATIENT
Start: 2025-07-19

## 2025-06-19 RX ORDER — HEPARIN 100 UNIT/ML
500 SYRINGE INTRAVENOUS AS NEEDED
OUTPATIENT
Start: 2025-06-19

## 2025-06-19 NOTE — PROGRESS NOTES
Patient ID: Donald Chowdary is a 64 y.o. male.  Referring Physician: No referring provider defined for this encounter.  Primary Care Provider: Sendy Hurt MD  Visit Type: Follow Up    Location: [facility]  Diagnosis/Reason:   Multifactorial Anemia - Iron Deficiency, Beta Thal Minor, IgG-Lambda MGUS  IgG-Lambda MGUS    Virtual or Telephone Consent  An interactive audio and video telecommunication system which permits real time communications between the patient (at the originating site) and provider (at the distant site) was utilized to provide this telehealth service.   Verbal consent was requested and obtained from Donald Chowdary on this date, 06/19/25 for a telehealth visit and the patient's location was confirmed at the time of the visit.    Interval Hx:  6/19/25  Donald Chowdary is a 64 y.o. male following up today for multifactorial anemia d/t iron deficiency, beta thal minor and IgG-lambda MGUS    Patient denies weight loss, abnormal bruising and bleeding, hematuria, blood in stool, dark/black stools, epistaxis, oral/gingival bleeding, lymphadenopathy, recurrent infections, recurrent fevers, night sweats, early satiety, abdominal pain, bone pain, chest pain, palpitations, SOB, MINOR, fatigue, dizziness, lightheadedness, PICA.    Relevant Hx:  6/13/25 - Initial Visit  Donald Chowdary is a 64 y.o. male referred for consultation of anemia    Per review of records:  Persistently microcytic, predominantly hypochromic anemia since at least 12/20/19 w/ Hb range of 7.0-11.4 since then   RBC counts low since 3/28/25, Hct's low since 12/20/19, RDW's persistently elevated    Previous Hematological Background:  Hx of hematological disorders: Yes - Patient reports a hematologic history of thalassemia  Hx of blood transfusions:   Hx of iron supplementation: Yes - Oral supplementation - C/o dark stools and constipation - Agent: FeSO4  Hx of B12 supplementation: Yes - Prior oral supplementation - Denies adverse effect and  reaction  Hx of folate supplementation: No - Denies any prior supplementation    Relevant medications:  Omeprazole 40mg QD    Patient denies weight loss, abnormal bruising and bleeding, hematuria, blood in stool, dark/black stools, epistaxis, oral/gingival bleeding, lymphadenopathy, recurrent infections, recurrent fevers, night sweats, early satiety, abdominal pain, bone pain, chest pain, palpitations, SOB, MINOR, fatigue, dizziness, lightheadedness, PICA.    PMHx:  Active Ambulatory Problems     Diagnosis Date Noted    Altered mental status, unspecified altered mental status type 03/28/2025     Resolved Ambulatory Problems     Diagnosis Date Noted    No Resolved Ambulatory Problems     Past Medical History:   Diagnosis Date    Personal history of other diseases of the circulatory system 02/19/2021    Personal history of other venous thrombosis and embolism 02/19/2021    Unspecified injury of left eye and orbit, initial encounter 02/19/2021      PSHx:  Past Surgical History:   Procedure Laterality Date    OTHER SURGICAL HISTORY  02/19/2021    Endovenous laser ablation      Hiatal hernia - At [facility] approximately [id number]  Hernia repair surgery approximately 2000    FHx:  Father: Thalassemia trait  Siblings: Sister w/ thalassemia trait, brother w/ thalassemia trait  Children: 1 son w/ thalassemia trait    Social Hx:  Donald Chowdary    reports that he has never smoked. He has never been exposed to tobacco smoke. He has never used smokeless tobacco.  He  has no history on file for alcohol use.  He  has no history on file for drug use.  Social History     Socioeconomic History    Marital status:    Tobacco Use    Smoking status: Never     Passive exposure: Never    Smokeless tobacco: Never     Social Drivers of Health     Financial Resource Strain: Low Risk  (3/29/2025)    Overall Financial Resource Strain (CARDIA)     Difficulty of Paying Living Expenses: Not hard at all   Food Insecurity: No Food Insecurity  (3/29/2025)    Hunger Vital Sign     Worried About Running Out of Food in the Last Year: Never true     Ran Out of Food in the Last Year: Never true   Transportation Needs: No Transportation Needs (3/29/2025)    PRAPARE - Transportation     Lack of Transportation (Medical): No     Lack of Transportation (Non-Medical): No   Physical Activity: Insufficiently Active (3/5/2025)    Received from [facility]    Exercise Vital Sign     Days of Exercise per Week: 1 day     Minutes of Exercise per Session: 10 min   Stress: No Stress Concern Present (3/5/2025)    Received from [facility]    [place] [employer] - Occupational Stress Questionnaire     Feeling of Stress : Not at all   Social Connections: Moderately Isolated (3/5/2025)    Received from [facility]    Social Connection and Isolation Panel [NHANES]     Frequency of Communication with Friends and Family: More than three times a week     Frequency of Social Gatherings with Friends and Family: Twice a week     Attends Ep[employer] Services: More than 4 times per year     Active Member of Clubs or Organizations: No     Attends Club or Organization Meetings: Never     Marital Status:    Intimate Partner Violence: Not At Risk (3/28/2025)    Humiliation, Afraid, Rape, and Kick questionnaire     Fear of Current or Ex-Partner: No     Emotionally Abused: No     Physically Abused: No     Sexually Abused: No   Housing Stability: Low Risk  (3/29/2025)    Housing Stability Vital Sign     Unable to Pay for Housing in the Last Year: No     Number of Times Moved in the Last Year: 0     Homeless in the Last Year: No      Living Situation: Lives at home alone  Occupation: Works as full-time   Marital Status: Single  Alcohol Use: Socially  Smoking: Never smoker  Recreational Drug Use: Denies  Special Diets: Regular Diet    Cancer Screenings:  Upper EGD: No record in EMR  Colonoscopy: No record in EMR   Prostate/PSA screenings: No record in  "EMR  Lung cancer screenings: No record in EMR    Medications and allergies reviewed in EMR.    ROS:  Review of Systems - Oncology   10 point review of systems negative except as state in HPI.    Vitals & Statistics:  Objective   BSA: There is no height or weight on file to calculate BSA.  There were no vitals taken for this visit.    Physical Exam:  Physical Exam  N/A - Virtual visit    Results:  Lab Results   Component Value Date    WBC 5.0 06/13/2025    NEUTROABS 2.68 06/13/2025    IGABSOL 0.01 06/13/2025    LYMPHSABS 1.65 06/13/2025    MONOSABS 0.44 06/13/2025    EOSABS 0.17 06/13/2025    BASOSABS 0.04 06/13/2025    RBC 5.03 06/13/2025    MCV 65 (L) 06/13/2025    MCHC 28.5 (L) 06/13/2025    HGB 9.3 (L) 06/13/2025    HCT 32.6 (L) 06/13/2025     06/13/2025     Lab Results   Component Value Date    RETICCTPCT 1.1 06/13/2025      Lab Results   Component Value Date    CREATININE 0.95 06/13/2025    BUN 21 06/13/2025    EGFR 90 06/13/2025     06/13/2025    K 4.6 06/13/2025     06/13/2025    CO2 28 06/13/2025      Lab Results   Component Value Date    ALT 14 06/13/2025    AST 16 06/13/2025    ALKPHOS 56 06/13/2025    BILITOT 0.5 06/13/2025      Lab Results   Component Value Date    TSH 2.20 06/13/2025     Lab Results   Component Value Date    TSH 2.20 06/13/2025     Lab Results   Component Value Date    IRON 30 (L) 06/13/2025    TIBC  06/13/2025      Comment:      One or more of the analytes used in this calculation is outside of the analytical measurement range.      FERRITIN 16 (L) 06/13/2025      Lab Results   Component Value Date    AXADTGCI69 563 06/13/2025      Lab Results   Component Value Date    FOLATE >24.0 06/13/2025     Lab Results   Component Value Date    JUNITO Positive (A) 06/13/2025    RF 38 (H) 06/13/2025    SEDRATE 17 06/13/2025      Lab Results   Component Value Date    CRP 0.19 06/13/2025      No results found for: \"ANA\"  Lab Results   Component Value Date     06/13/2025 " "    Lab Results   Component Value Date    HAPTOGLOBIN 180 06/13/2025     Lab Results   Component Value Date    SPEP Aberrant band detected. See immunofixation. 06/13/2025     Lab Results   Component Value Date    IGG 1,620 (H) 06/13/2025     06/13/2025     06/13/2025     No results found for: \"HEPATOT\", \"HEPAIGM\", \"HEPBCIGM\", \"HEPBCAB\", \"HEPBSAG\", \"HEPCAB\"  No results found for: \"HIV1X2\"    Assessment:  Donald Chowdary is a 64 y.o. male following up today for multifactorial anemia d/t iron deficiency, beta thal minor and IgG-lambda MGUS    I reviewed patient's chart including but not limited to labs, imaging, surgical/procedure notes, pathology, hospital notes, doctor's notes.    Relevant historical labs/imaging:  3/28/25  TSH: WNL    6/13/25 results:  WBC count & Differential WNL  MC/HC anemia w/ Hb at 9.3 - RBC count & Hct low - RDW elevated  Platelets WNL  Hb-ID: Consistent with Beta-thalassemia minor   Iron studies: Ferritin low at 16 - Iron low at 30 - TIBC and %Sat not calculated  EPO: Elevated but not appropriately elevated for patients degree of anemia  SPEP: Monoclonal IgG lambda at 0.1  Igg's: IgG elevated at 1620, IgM and IgA WNL  FLC's: Kappa and lambda elevated, ratio WNL - Likely d/t underlying inflammation  JUNITO: Positive w/ 1:160 speckled titer and no ab's identified - Per UpToDate titer is not clinically significant and likely represents a false positive  RF: Elevated at 38 - Indicative of possible underlying RA    Plan:  6/19/25 - Hematologic workup revealed microcytic/hypochromic anemia with hemoglobin at 9.3 as well as evidence of patient having beta thalassemia minor, evidence of iron deficiency, evidence of MGUS, underlying inflammation and possible underlying RA.  There was no evidence of other nutrient deficiency, hemolysis, thyroid disease, hepatic disease, renal disease.  Therefore patient anemia is favored to be multifactorial anemia due to beta thalassemia minor, iron " deficiency, IgG-lambda MGUS.  Patient will most likely benefit from iron supplement mentation to replenish iron stores as well as consultation with gastroenterology for colonoscopy and EGD to rule out underlying GI bleed or malignancy    1) Multifactorial Anemia - Iron Deficiency, Beta Thal Minor, IgG-Lambda MGUS  2) IgG-Lambda MGUS  Start carbonyl iron-ascorbic acid 125-65mg 1 tab PO QD  Request prior authorization for IV iron sucrose (Venofer) 300mg weekly x 1 (max per cycle 3)  Dx Codes: D50.9 - Iron Deficiency Anemia, K90.9 - Malabsorption of Iron  Patient will be out of town until 7/18/25 and requested to postpone dose until he returns 7/19/25  Will switch to preferred insurance product if denied and preferred product available on  formulary  RT 3-4 months via virtual/telehealth visit - F/U sooner if needed/urgent    I had an extensive discussion with the patient regarding the diagnosis and discussed the plan of therapy, including general considerations regarding side effects and outcomes. Pt understood and gave appropriate teach back about the plan of care. All questions were answered to the patient's satisfaction. The patient is instructed to contact us at any time if questions or problems arise. Thank you for the opportunity to participate in the care of this very pleasant patient.    Total time = 30 minutes. 50% or more of this time was spent in counseling and/or coordination of care including reviewing medical history/radiology/labs, examining patient, formulating outlined plan with team, and discussing plan with patient/family.    Casey Moreira PA-C

## 2025-07-19 ENCOUNTER — APPOINTMENT (OUTPATIENT)
Dept: HEMATOLOGY/ONCOLOGY | Facility: HOSPITAL | Age: 65
End: 2025-07-19
Payer: MEDICAID

## 2025-07-21 ENCOUNTER — APPOINTMENT (OUTPATIENT)
Dept: HEMATOLOGY/ONCOLOGY | Facility: HOSPITAL | Age: 65
End: 2025-07-21
Payer: MEDICAID

## 2025-08-01 ENCOUNTER — INFUSION (OUTPATIENT)
Dept: HEMATOLOGY/ONCOLOGY | Facility: HOSPITAL | Age: 65
End: 2025-08-01
Payer: MEDICAID

## 2025-08-01 VITALS
HEART RATE: 61 BPM | SYSTOLIC BLOOD PRESSURE: 129 MMHG | WEIGHT: 199.5 LBS | OXYGEN SATURATION: 100 % | TEMPERATURE: 97.5 F | BODY MASS INDEX: 29.38 KG/M2 | RESPIRATION RATE: 18 BRPM | DIASTOLIC BLOOD PRESSURE: 77 MMHG

## 2025-08-01 DIAGNOSIS — D50.9 IRON DEFICIENCY ANEMIA, UNSPECIFIED IRON DEFICIENCY ANEMIA TYPE: ICD-10-CM

## 2025-08-01 DIAGNOSIS — K90.9 IRON MALABSORPTION (HHS-HCC): ICD-10-CM

## 2025-08-01 PROCEDURE — 2500000004 HC RX 250 GENERAL PHARMACY W/ HCPCS (ALT 636 FOR OP/ED): Mod: SE | Performed by: PHYSICIAN ASSISTANT

## 2025-08-01 PROCEDURE — 96365 THER/PROPH/DIAG IV INF INIT: CPT | Mod: INF

## 2025-08-01 RX ORDER — DIPHENHYDRAMINE HYDROCHLORIDE 50 MG/ML
50 INJECTION, SOLUTION INTRAMUSCULAR; INTRAVENOUS AS NEEDED
Status: DISCONTINUED | OUTPATIENT
Start: 2025-08-01 | End: 2025-08-01 | Stop reason: HOSPADM

## 2025-08-01 RX ORDER — ALBUTEROL SULFATE 0.83 MG/ML
3 SOLUTION RESPIRATORY (INHALATION) AS NEEDED
Status: DISCONTINUED | OUTPATIENT
Start: 2025-08-01 | End: 2025-08-01 | Stop reason: HOSPADM

## 2025-08-01 RX ORDER — FAMOTIDINE 10 MG/ML
20 INJECTION, SOLUTION INTRAVENOUS ONCE AS NEEDED
Status: DISCONTINUED | OUTPATIENT
Start: 2025-08-01 | End: 2025-08-01 | Stop reason: HOSPADM

## 2025-08-01 RX ORDER — ALBUTEROL SULFATE 0.83 MG/ML
3 SOLUTION RESPIRATORY (INHALATION) AS NEEDED
OUTPATIENT
Start: 2025-08-01

## 2025-08-01 RX ORDER — DIPHENHYDRAMINE HYDROCHLORIDE 50 MG/ML
50 INJECTION, SOLUTION INTRAMUSCULAR; INTRAVENOUS AS NEEDED
OUTPATIENT
Start: 2025-08-01

## 2025-08-01 RX ORDER — FAMOTIDINE 10 MG/ML
20 INJECTION, SOLUTION INTRAVENOUS ONCE AS NEEDED
OUTPATIENT
Start: 2025-08-01

## 2025-08-01 RX ORDER — EPINEPHRINE 0.3 MG/.3ML
0.3 INJECTION SUBCUTANEOUS EVERY 5 MIN PRN
Status: DISCONTINUED | OUTPATIENT
Start: 2025-08-01 | End: 2025-08-01 | Stop reason: HOSPADM

## 2025-08-01 RX ORDER — EPINEPHRINE 0.3 MG/.3ML
0.3 INJECTION SUBCUTANEOUS EVERY 5 MIN PRN
OUTPATIENT
Start: 2025-08-01

## 2025-08-01 RX ORDER — HEPARIN SODIUM,PORCINE/PF 10 UNIT/ML
50 SYRINGE (ML) INTRAVENOUS AS NEEDED
OUTPATIENT
Start: 2025-08-01

## 2025-08-01 RX ORDER — HEPARIN 100 UNIT/ML
500 SYRINGE INTRAVENOUS AS NEEDED
OUTPATIENT
Start: 2025-08-01

## 2025-08-01 RX ADMIN — IRON SUCROSE 300 MG: 20 INJECTION, SOLUTION INTRAVENOUS at 08:45

## 2025-08-01 ASSESSMENT — PAIN SCALES - GENERAL: PAINLEVEL_OUTOF10: 0-NO PAIN

## 2025-08-01 NOTE — PROGRESS NOTES
Patient arrives for first dose Venofer.  Tolerated infusion well, observed for 30 minutes with no complications and discharged in stable condition.

## 2025-10-15 ENCOUNTER — APPOINTMENT (OUTPATIENT)
Dept: HEMATOLOGY/ONCOLOGY | Facility: HOSPITAL | Age: 65
End: 2025-10-15
Payer: MEDICAID

## 2025-10-27 ENCOUNTER — APPOINTMENT (OUTPATIENT)
Facility: CLINIC | Age: 65
End: 2025-10-27
Payer: MEDICAID